# Patient Record
Sex: MALE | Race: OTHER | HISPANIC OR LATINO
[De-identification: names, ages, dates, MRNs, and addresses within clinical notes are randomized per-mention and may not be internally consistent; named-entity substitution may affect disease eponyms.]

---

## 2017-04-09 ENCOUNTER — TRANSCRIPTION ENCOUNTER (OUTPATIENT)
Age: 27
End: 2017-04-09

## 2017-08-10 ENCOUNTER — TRANSCRIPTION ENCOUNTER (OUTPATIENT)
Age: 27
End: 2017-08-10

## 2017-10-25 ENCOUNTER — EMERGENCY (EMERGENCY)
Facility: HOSPITAL | Age: 27
LOS: 0 days | Discharge: HOME | End: 2017-10-26
Attending: EMERGENCY MEDICINE | Admitting: EMERGENCY MEDICINE
Payer: MEDICAID

## 2017-10-25 VITALS
DIASTOLIC BLOOD PRESSURE: 66 MMHG | TEMPERATURE: 98 F | SYSTOLIC BLOOD PRESSURE: 143 MMHG | OXYGEN SATURATION: 100 % | HEART RATE: 105 BPM | RESPIRATION RATE: 16 BRPM | WEIGHT: 214.95 LBS | HEIGHT: 68 IN

## 2017-10-25 PROCEDURE — 99284 EMERGENCY DEPT VISIT MOD MDM: CPT | Mod: 25

## 2017-10-25 RX ORDER — LEVETIRACETAM 250 MG/1
1500 TABLET, FILM COATED ORAL ONCE
Qty: 0 | Refills: 0 | Status: COMPLETED | OUTPATIENT
Start: 2017-10-25 | End: 2017-10-25

## 2017-10-25 RX ORDER — SODIUM CHLORIDE 9 MG/ML
1000 INJECTION INTRAMUSCULAR; INTRAVENOUS; SUBCUTANEOUS ONCE
Qty: 0 | Refills: 0 | Status: COMPLETED | OUTPATIENT
Start: 2017-10-25 | End: 2017-10-25

## 2017-10-25 NOTE — ED PROVIDER NOTE - OBJECTIVE STATEMENT
26 y/o M PMHx Seizure since 17, on Keppra, presents to the ED s/p seizure. The pt provides that he missed his dose of Keppra last night, did not take a dose today morning, and had a seizure today evening. The pt notes that he sat on a chair when the episode occurred, has minor tongue bite and currently has a headache. No h/o head trauma, fever, chills, dizziness, abd pain, nvd, cp, cough, sob, rash or urinary incontinence.

## 2017-10-26 VITALS
RESPIRATION RATE: 17 BRPM | HEART RATE: 90 BPM | DIASTOLIC BLOOD PRESSURE: 63 MMHG | OXYGEN SATURATION: 100 % | TEMPERATURE: 98 F | SYSTOLIC BLOOD PRESSURE: 133 MMHG

## 2017-10-26 DIAGNOSIS — R56.9 UNSPECIFIED CONVULSIONS: ICD-10-CM

## 2017-10-26 RX ADMIN — LEVETIRACETAM 400 MILLIGRAM(S): 250 TABLET, FILM COATED ORAL at 00:57

## 2017-10-26 RX ADMIN — SODIUM CHLORIDE 1000 MILLILITER(S): 9 INJECTION INTRAMUSCULAR; INTRAVENOUS; SUBCUTANEOUS at 00:56

## 2017-10-26 NOTE — ED ADULT NURSE NOTE - CHIEF COMPLAINT QUOTE
pt had witnessed seizure, did not hit head, did not fall, bit tongue. no obvious injury or trauma. no acute distress.

## 2017-10-26 NOTE — ED ADULT NURSE NOTE - NS ED NURSE DC INFO COMPLEXITY
Verbalized Understanding/Patient asked questions/Returned Demonstration/Simple: Patient demonstrates quick and easy understanding/Straightforward: Basic instructions, no meds, no home treatment

## 2017-12-05 ENCOUNTER — EMERGENCY (EMERGENCY)
Facility: HOSPITAL | Age: 27
LOS: 1 days | Discharge: HOME | End: 2017-12-05
Attending: STUDENT IN AN ORGANIZED HEALTH CARE EDUCATION/TRAINING PROGRAM | Admitting: STUDENT IN AN ORGANIZED HEALTH CARE EDUCATION/TRAINING PROGRAM
Payer: COMMERCIAL

## 2017-12-05 VITALS
RESPIRATION RATE: 16 BRPM | TEMPERATURE: 98 F | OXYGEN SATURATION: 98 % | SYSTOLIC BLOOD PRESSURE: 136 MMHG | DIASTOLIC BLOOD PRESSURE: 78 MMHG | HEART RATE: 81 BPM

## 2017-12-05 VITALS
OXYGEN SATURATION: 98 % | RESPIRATION RATE: 18 BRPM | DIASTOLIC BLOOD PRESSURE: 78 MMHG | WEIGHT: 214.95 LBS | HEIGHT: 68 IN | TEMPERATURE: 98 F | SYSTOLIC BLOOD PRESSURE: 144 MMHG | HEART RATE: 94 BPM

## 2017-12-05 LAB
ALBUMIN SERPL ELPH-MCNC: 4.6 G/DL — SIGNIFICANT CHANGE UP (ref 3.3–5.2)
ALP SERPL-CCNC: 88 U/L — SIGNIFICANT CHANGE UP (ref 40–120)
ALT FLD-CCNC: 28 U/L — SIGNIFICANT CHANGE UP
ANION GAP SERPL CALC-SCNC: 17 MMOL/L — SIGNIFICANT CHANGE UP (ref 5–17)
AST SERPL-CCNC: 25 U/L — SIGNIFICANT CHANGE UP
BASOPHILS # BLD AUTO: 0 K/UL — SIGNIFICANT CHANGE UP (ref 0–0.2)
BASOPHILS NFR BLD AUTO: 0.5 % — SIGNIFICANT CHANGE UP (ref 0–2)
BILIRUB SERPL-MCNC: 0.4 MG/DL — SIGNIFICANT CHANGE UP (ref 0.4–2)
BUN SERPL-MCNC: 12 MG/DL — SIGNIFICANT CHANGE UP (ref 8–20)
CALCIUM SERPL-MCNC: 9.5 MG/DL — SIGNIFICANT CHANGE UP (ref 8.6–10.2)
CHLORIDE SERPL-SCNC: 104 MMOL/L — SIGNIFICANT CHANGE UP (ref 98–107)
CO2 SERPL-SCNC: 20 MMOL/L — LOW (ref 22–29)
CREAT SERPL-MCNC: 1.05 MG/DL — SIGNIFICANT CHANGE UP (ref 0.5–1.3)
EOSINOPHIL # BLD AUTO: 0.3 K/UL — SIGNIFICANT CHANGE UP (ref 0–0.5)
EOSINOPHIL NFR BLD AUTO: 3.4 % — SIGNIFICANT CHANGE UP (ref 0–6)
GLUCOSE SERPL-MCNC: 142 MG/DL — HIGH (ref 70–115)
HCT VFR BLD CALC: 43.7 % — SIGNIFICANT CHANGE UP (ref 42–52)
HGB BLD-MCNC: 15.2 G/DL — SIGNIFICANT CHANGE UP (ref 14–18)
LYMPHOCYTES # BLD AUTO: 3.6 K/UL — SIGNIFICANT CHANGE UP (ref 1–4.8)
LYMPHOCYTES # BLD AUTO: 43.3 % — SIGNIFICANT CHANGE UP (ref 20–55)
MCHC RBC-ENTMCNC: 29.7 PG — SIGNIFICANT CHANGE UP (ref 27–31)
MCHC RBC-ENTMCNC: 34.8 G/DL — SIGNIFICANT CHANGE UP (ref 32–36)
MCV RBC AUTO: 85.5 FL — SIGNIFICANT CHANGE UP (ref 80–94)
MONOCYTES # BLD AUTO: 0.8 K/UL — SIGNIFICANT CHANGE UP (ref 0–0.8)
MONOCYTES NFR BLD AUTO: 9.7 % — SIGNIFICANT CHANGE UP (ref 3–10)
NEUTROPHILS # BLD AUTO: 3.5 K/UL — SIGNIFICANT CHANGE UP (ref 1.8–8)
NEUTROPHILS NFR BLD AUTO: 43 % — SIGNIFICANT CHANGE UP (ref 37–73)
PLATELET # BLD AUTO: 237 K/UL — SIGNIFICANT CHANGE UP (ref 150–400)
POTASSIUM SERPL-MCNC: 4 MMOL/L — SIGNIFICANT CHANGE UP (ref 3.5–5.3)
POTASSIUM SERPL-SCNC: 4 MMOL/L — SIGNIFICANT CHANGE UP (ref 3.5–5.3)
PROT SERPL-MCNC: 8 G/DL — SIGNIFICANT CHANGE UP (ref 6.6–8.7)
RBC # BLD: 5.11 M/UL — SIGNIFICANT CHANGE UP (ref 4.6–6.2)
RBC # FLD: 13.3 % — SIGNIFICANT CHANGE UP (ref 11–15.6)
SODIUM SERPL-SCNC: 141 MMOL/L — SIGNIFICANT CHANGE UP (ref 135–145)
WBC # BLD: 8.2 K/UL — SIGNIFICANT CHANGE UP (ref 4.8–10.8)
WBC # FLD AUTO: 8.2 K/UL — SIGNIFICANT CHANGE UP (ref 4.8–10.8)

## 2017-12-05 PROCEDURE — 80053 COMPREHEN METABOLIC PANEL: CPT

## 2017-12-05 PROCEDURE — 99284 EMERGENCY DEPT VISIT MOD MDM: CPT

## 2017-12-05 PROCEDURE — 36415 COLL VENOUS BLD VENIPUNCTURE: CPT

## 2017-12-05 PROCEDURE — 85027 COMPLETE CBC AUTOMATED: CPT

## 2017-12-05 PROCEDURE — 96374 THER/PROPH/DIAG INJ IV PUSH: CPT

## 2017-12-05 PROCEDURE — 99284 EMERGENCY DEPT VISIT MOD MDM: CPT | Mod: 25

## 2017-12-05 RX ORDER — LEVETIRACETAM 250 MG/1
1000 TABLET, FILM COATED ORAL ONCE
Qty: 0 | Refills: 0 | Status: COMPLETED | OUTPATIENT
Start: 2017-12-05 | End: 2017-12-05

## 2017-12-05 RX ADMIN — LEVETIRACETAM 400 MILLIGRAM(S): 250 TABLET, FILM COATED ORAL at 21:54

## 2017-12-05 NOTE — ED ADULT TRIAGE NOTE - CHIEF COMPLAINT QUOTE
pt AOX3, BIBA s/p seizure. states he missed his dose of Keppra last night and today, last seizure was 2015. pt in no distress

## 2017-12-05 NOTE — ED PROVIDER NOTE - CONSTITUTIONAL, MLM
normal... Appears fatigued but states has not been sleeping well at home due to stress, his wife is 32 weeks pregnant.

## 2017-12-05 NOTE — ED PROVIDER NOTE - MEDICAL DECISION MAKING DETAILS
Patient with breakthrough seizure due to missing AM medication Patient with breakthrough seizure due to missing AM medication. Patient remained stable during ED evaluation

## 2017-12-05 NOTE — ED ADULT NURSE REASSESSMENT NOTE - NS ED NURSE REASSESS COMMENT FT1
pt medicated.  discharge instructions reviewed.  pt verbalized understanding.  will f/u with pmd and neurologist.  pt left ambulatory with family driving him home in private car. pt medicated.  iv removed with sl intact.  discharge instructions reviewed.  pt verbalized understanding.  will f/u with pmd and neurologist.  pt left ambulatory with family driving him home in private car.

## 2017-12-05 NOTE — ED PROVIDER NOTE - NEUROLOGICAL, MLM
Alert and oriented, no focal deficits, no motor or sensory deficits. Cranial nerves 2-12 intact. Follows commands. No pronator drift.

## 2018-01-14 ENCOUNTER — TRANSCRIPTION ENCOUNTER (OUTPATIENT)
Age: 28
End: 2018-01-14

## 2018-07-14 ENCOUNTER — EMERGENCY (EMERGENCY)
Facility: HOSPITAL | Age: 28
LOS: 0 days | Discharge: HOME | End: 2018-07-14
Attending: EMERGENCY MEDICINE | Admitting: EMERGENCY MEDICINE
Payer: COMMERCIAL

## 2018-07-14 VITALS
HEART RATE: 103 BPM | RESPIRATION RATE: 18 BRPM | TEMPERATURE: 98 F | SYSTOLIC BLOOD PRESSURE: 129 MMHG | DIASTOLIC BLOOD PRESSURE: 82 MMHG | HEIGHT: 68 IN | OXYGEN SATURATION: 94 % | WEIGHT: 210.1 LBS

## 2018-07-14 DIAGNOSIS — R56.9 UNSPECIFIED CONVULSIONS: ICD-10-CM

## 2018-07-14 PROCEDURE — 99285 EMERGENCY DEPT VISIT HI MDM: CPT

## 2018-07-14 RX ORDER — LEVETIRACETAM 250 MG/1
1500 TABLET, FILM COATED ORAL ONCE
Qty: 0 | Refills: 0 | Status: COMPLETED | OUTPATIENT
Start: 2018-07-14 | End: 2018-07-14

## 2018-07-14 RX ADMIN — LEVETIRACETAM 1500 MILLIGRAM(S): 250 TABLET, FILM COATED ORAL at 13:09

## 2018-07-14 NOTE — ED PROVIDER NOTE - OBJECTIVE STATEMENT
29 y/o m with PMHx of seizures on Keppra presenting to the ED c/o witnessed seizure at home today. Pt states he forgot to take his seizure medication last night. Denies any other acute c/o. No head trauma.

## 2018-07-14 NOTE — ED ADULT NURSE NOTE - OBJECTIVE STATEMENT
pt had a  seizure, however know axox4. Pt states this occurs 2x year and stress/ tiredness is a trigger.

## 2019-07-25 ENCOUNTER — EMERGENCY (EMERGENCY)
Facility: HOSPITAL | Age: 29
LOS: 0 days | Discharge: HOME | End: 2019-07-25
Attending: EMERGENCY MEDICINE
Payer: COMMERCIAL

## 2019-07-25 VITALS
RESPIRATION RATE: 18 BRPM | HEART RATE: 56 BPM | DIASTOLIC BLOOD PRESSURE: 89 MMHG | OXYGEN SATURATION: 100 % | SYSTOLIC BLOOD PRESSURE: 127 MMHG | TEMPERATURE: 98 F

## 2019-07-25 VITALS
HEIGHT: 68 IN | DIASTOLIC BLOOD PRESSURE: 91 MMHG | OXYGEN SATURATION: 97 % | RESPIRATION RATE: 18 BRPM | SYSTOLIC BLOOD PRESSURE: 134 MMHG | TEMPERATURE: 98 F | HEART RATE: 64 BPM | WEIGHT: 214.95 LBS

## 2019-07-25 DIAGNOSIS — K08.89 OTHER SPECIFIED DISORDERS OF TEETH AND SUPPORTING STRUCTURES: ICD-10-CM

## 2019-07-25 DIAGNOSIS — G40.909 EPILEPSY, UNSPECIFIED, NOT INTRACTABLE, WITHOUT STATUS EPILEPTICUS: ICD-10-CM

## 2019-07-25 PROBLEM — R56.9 UNSPECIFIED CONVULSIONS: Chronic | Status: ACTIVE | Noted: 2017-12-06

## 2019-07-25 PROCEDURE — 99283 EMERGENCY DEPT VISIT LOW MDM: CPT

## 2019-07-25 RX ORDER — OXYCODONE HYDROCHLORIDE 5 MG/1
10 TABLET ORAL ONCE
Refills: 0 | Status: DISCONTINUED | OUTPATIENT
Start: 2019-07-25 | End: 2019-07-25

## 2019-07-25 RX ORDER — PENICILLIN V POTASSIUM 250 MG
500 TABLET ORAL ONCE
Refills: 0 | Status: COMPLETED | OUTPATIENT
Start: 2019-07-25 | End: 2019-07-25

## 2019-07-25 RX ORDER — PENICILLIN V POTASSIUM 250 MG
1 TABLET ORAL
Qty: 28 | Refills: 0
Start: 2019-07-25

## 2019-07-25 RX ADMIN — Medication 500 MILLIGRAM(S): at 07:19

## 2019-07-25 RX ADMIN — OXYCODONE HYDROCHLORIDE 10 MILLIGRAM(S): 5 TABLET ORAL at 07:20

## 2019-07-25 NOTE — ED PROVIDER NOTE - NSFOLLOWUPINSTRUCTIONS_ED_ALL_ED_FT
Follow up with your dentist TODAY  Penicillin 500mg every 6 hours for 7 days or until told to stop by your dentist  Ibuprofen 600mg every 6 hours for pain  Oxycodone if needed  Return to ED for any further concerns    Dental Pain    Dental pain (toothache) may be caused by many things including tooth decay (cavities or caries), abscess or infection, or trauma. If you were prescribed an antibiotic medicine, finish all of it even if you start to feel better. Rinsing your mouth with salt water or applying ice to the painful area of your face may help with the pain. Follow up with a dentist is important in ensuring good oral health and preventing the worsening of dental disease.    SEEK IMMEDIATE MEDICAL CARE IF YOU HAVE ANY OF THE FOLLOWING SYMPTOMS: unable to open your mouth, trouble breathing or swallowing, fever, or swelling of the face, neck, or jaw.

## 2019-07-25 NOTE — ED ADULT NURSE NOTE - NSIMPLEMENTINTERV_GEN_ALL_ED
Implemented All Universal Safety Interventions:  [place] to call system. Call bell, personal items and telephone within reach. Instruct patient to call for assistance. Room bathroom lighting operational. Non-slip footwear when patient is off stretcher. Physically safe environment: no spills, clutter or unnecessary equipment. Stretcher in lowest position, wheels locked, appropriate side rails in place.

## 2019-07-25 NOTE — ED ADULT NURSE NOTE - OBJECTIVE STATEMENT
c/o left upper tooth pain especially when eating hot & cold foods. Pt believes his filling fell out.

## 2019-07-25 NOTE — ED PROVIDER NOTE - OBJECTIVE STATEMENT
28 yo male with h/o Seizure d/o on Keppra c/o toothache. Patient has had pain in the left upper tooth for days.  Tonight the pain became severe.  No relief with ibuprofen taken 2-3 hours ago.

## 2019-07-25 NOTE — ED PROVIDER NOTE - CLINICAL SUMMARY MEDICAL DECISION MAKING FREE TEXT BOX
toothache, no s/o abscess, will give abx and a few pain pills.  pt has a dentist he will f/u with today

## 2019-07-25 NOTE — ED PROVIDER NOTE - ENMT, MLM
Airway patent, Nasal mucosa clear. Mouth with normal mucosa. Throat has no vesicles, no oropharyngeal exudates and uvula is midline.  Left upper first molar with partially missing filling, tender with no gum swelling.  Floor of mouth wnl.

## 2020-01-24 ENCOUNTER — EMERGENCY (EMERGENCY)
Facility: HOSPITAL | Age: 30
LOS: 0 days | Discharge: HOME | End: 2020-01-24
Attending: EMERGENCY MEDICINE
Payer: COMMERCIAL

## 2020-01-24 VITALS
WEIGHT: 179.9 LBS | HEART RATE: 108 BPM | TEMPERATURE: 99 F | SYSTOLIC BLOOD PRESSURE: 134 MMHG | DIASTOLIC BLOOD PRESSURE: 90 MMHG | HEIGHT: 67 IN | OXYGEN SATURATION: 97 % | RESPIRATION RATE: 17 BRPM

## 2020-01-24 VITALS
HEART RATE: 98 BPM | OXYGEN SATURATION: 98 % | TEMPERATURE: 98 F | SYSTOLIC BLOOD PRESSURE: 116 MMHG | RESPIRATION RATE: 16 BRPM | DIASTOLIC BLOOD PRESSURE: 77 MMHG
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
## 2020-01-24 DIAGNOSIS — G40.909 EPILEPSY, UNSPECIFIED, NOT INTRACTABLE, WITHOUT STATUS EPILEPTICUS: ICD-10-CM
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
## 2020-01-24 DIAGNOSIS — Z23 ENCOUNTER FOR IMMUNIZATION: ICD-10-CM

## 2020-01-24 DIAGNOSIS — S00.512A ABRASION OF ORAL CAVITY, INITIAL ENCOUNTER: ICD-10-CM

## 2020-01-24 LAB
ALBUMIN SERPL ELPH-MCNC: 4 G/DL — SIGNIFICANT CHANGE UP (ref 3.3–5)
ALP SERPL-CCNC: 74 U/L — SIGNIFICANT CHANGE UP (ref 40–120)
ALT FLD-CCNC: 37 U/L — SIGNIFICANT CHANGE UP (ref 12–78)
ANION GAP SERPL CALC-SCNC: 4 MMOL/L — LOW (ref 5–17)
AST SERPL-CCNC: 20 U/L — SIGNIFICANT CHANGE UP (ref 15–37)
BASOPHILS # BLD AUTO: 0.02 K/UL — SIGNIFICANT CHANGE UP (ref 0–0.2)
BASOPHILS NFR BLD AUTO: 0.2 % — SIGNIFICANT CHANGE UP (ref 0–2)
BILIRUB SERPL-MCNC: 0.5 MG/DL — SIGNIFICANT CHANGE UP (ref 0.2–1.2)
BUN SERPL-MCNC: 13 MG/DL — SIGNIFICANT CHANGE UP (ref 7–23)
CALCIUM SERPL-MCNC: 9 MG/DL — SIGNIFICANT CHANGE UP (ref 8.5–10.1)
CHLORIDE SERPL-SCNC: 109 MMOL/L — HIGH (ref 96–108)
CO2 SERPL-SCNC: 27 MMOL/L — SIGNIFICANT CHANGE UP (ref 22–31)
CREAT SERPL-MCNC: 1.08 MG/DL — SIGNIFICANT CHANGE UP (ref 0.5–1.3)
EOSINOPHIL # BLD AUTO: 0.1 K/UL — SIGNIFICANT CHANGE UP (ref 0–0.5)
EOSINOPHIL NFR BLD AUTO: 1.2 % — SIGNIFICANT CHANGE UP (ref 0–6)
GLUCOSE SERPL-MCNC: 123 MG/DL — HIGH (ref 70–99)
HCT VFR BLD CALC: 45 % — SIGNIFICANT CHANGE UP (ref 39–50)
HGB BLD-MCNC: 15.3 G/DL — SIGNIFICANT CHANGE UP (ref 13–17)
IMM GRANULOCYTES NFR BLD AUTO: 0.4 % — SIGNIFICANT CHANGE UP (ref 0–1.5)
LYMPHOCYTES # BLD AUTO: 2.18 K/UL — SIGNIFICANT CHANGE UP (ref 1–3.3)
LYMPHOCYTES # BLD AUTO: 26.8 % — SIGNIFICANT CHANGE UP (ref 13–44)
MCHC RBC-ENTMCNC: 29.3 PG — SIGNIFICANT CHANGE UP (ref 27–34)
MCHC RBC-ENTMCNC: 34 GM/DL — SIGNIFICANT CHANGE UP (ref 32–36)
MCV RBC AUTO: 86 FL — SIGNIFICANT CHANGE UP (ref 80–100)
MONOCYTES # BLD AUTO: 0.71 K/UL — SIGNIFICANT CHANGE UP (ref 0–0.9)
MONOCYTES NFR BLD AUTO: 8.7 % — SIGNIFICANT CHANGE UP (ref 2–14)
NEUTROPHILS # BLD AUTO: 5.09 K/UL — SIGNIFICANT CHANGE UP (ref 1.8–7.4)
NEUTROPHILS NFR BLD AUTO: 62.7 % — SIGNIFICANT CHANGE UP (ref 43–77)
PLATELET # BLD AUTO: 252 K/UL — SIGNIFICANT CHANGE UP (ref 150–400)
POTASSIUM SERPL-MCNC: 3.9 MMOL/L — SIGNIFICANT CHANGE UP (ref 3.5–5.3)
POTASSIUM SERPL-SCNC: 3.9 MMOL/L — SIGNIFICANT CHANGE UP (ref 3.5–5.3)
PROT SERPL-MCNC: 7.7 GM/DL — SIGNIFICANT CHANGE UP (ref 6–8.3)
RBC # BLD: 5.23 M/UL — SIGNIFICANT CHANGE UP (ref 4.2–5.8)
RBC # FLD: 12.7 % — SIGNIFICANT CHANGE UP (ref 10.3–14.5)
SODIUM SERPL-SCNC: 140 MMOL/L — SIGNIFICANT CHANGE UP (ref 135–145)
WBC # BLD: 8.13 K/UL — SIGNIFICANT CHANGE UP (ref 3.8–10.5)
WBC # FLD AUTO: 8.13 K/UL — SIGNIFICANT CHANGE UP (ref 3.8–10.5)

## 2020-01-24 PROCEDURE — 96360 HYDRATION IV INFUSION INIT: CPT

## 2020-01-24 PROCEDURE — 82962 GLUCOSE BLOOD TEST: CPT

## 2020-01-24 PROCEDURE — 36415 COLL VENOUS BLD VENIPUNCTURE: CPT

## 2020-01-24 PROCEDURE — 93010 ELECTROCARDIOGRAM REPORT: CPT

## 2020-01-24 PROCEDURE — 90471 IMMUNIZATION ADMIN: CPT

## 2020-01-24 PROCEDURE — 80053 COMPREHEN METABOLIC PANEL: CPT

## 2020-01-24 PROCEDURE — 99283 EMERGENCY DEPT VISIT LOW MDM: CPT | Mod: 25

## 2020-01-24 PROCEDURE — 99283 EMERGENCY DEPT VISIT LOW MDM: CPT

## 2020-01-24 PROCEDURE — 90715 TDAP VACCINE 7 YRS/> IM: CPT

## 2020-01-24 PROCEDURE — 85025 COMPLETE CBC W/AUTO DIFF WBC: CPT

## 2020-01-24 PROCEDURE — 93005 ELECTROCARDIOGRAM TRACING: CPT

## 2020-01-24 RX ORDER — SODIUM CHLORIDE 9 MG/ML
1000 INJECTION INTRAMUSCULAR; INTRAVENOUS; SUBCUTANEOUS ONCE
Refills: 0 | Status: COMPLETED | OUTPATIENT
Start: 2020-01-24 | End: 2020-01-24

## 2020-01-24 RX ORDER — TETANUS TOXOID, REDUCED DIPHTHERIA TOXOID AND ACELLULAR PERTUSSIS VACCINE, ADSORBED 5; 2.5; 8; 8; 2.5 [IU]/.5ML; [IU]/.5ML; UG/.5ML; UG/.5ML; UG/.5ML
0.5 SUSPENSION INTRAMUSCULAR ONCE
Refills: 0 | Status: COMPLETED | OUTPATIENT
Start: 2020-01-24 | End: 2020-01-24

## 2020-01-24 RX ADMIN — SODIUM CHLORIDE 1000 MILLILITER(S): 9 INJECTION INTRAMUSCULAR; INTRAVENOUS; SUBCUTANEOUS at 07:50

## 2020-01-24 RX ADMIN — SODIUM CHLORIDE 1000 MILLILITER(S): 9 INJECTION INTRAMUSCULAR; INTRAVENOUS; SUBCUTANEOUS at 08:50

## 2020-01-24 RX ADMIN — TETANUS TOXOID, REDUCED DIPHTHERIA TOXOID AND ACELLULAR PERTUSSIS VACCINE, ADSORBED 0.5 MILLILITER(S): 5; 2.5; 8; 8; 2.5 SUSPENSION INTRAMUSCULAR at 08:01

## 2020-01-24 NOTE — ED PROVIDER NOTE - PATIENT PORTAL LINK FT
You can access the FollowMyHealth Patient Portal offered by [facility] by registering at the following website: [email or web address]. By joining [employer]’s FollowMyHealth portal, you will also be able to view your health information using other applications (apps) compatible with our system.

## 2020-01-24 NOTE — ED PROVIDER NOTE - OBJECTIVE STATEMENT
29M hx epilepsy on keppra 1250mg BID presents to the ED for seizure. Pt states he woke up this morning and knows he had a seizure - feels soreness on his tongue dehydrated. family states he was confused so called EMS. no back to baseline. no head injury. 29M hx epilepsy on keppra 1250mg BID presents to the ED for seizure. Pt states he woke up this morning and knows he had a seizure - feels soreness on his tongue dehydrated. family states he was confused so called EMS. now back to baseline. no head injury. states he missed his dose of keppra last night and hasn't been sleeping as well lately. follows with neurology DR. Pozo. no fevers neck pain cp sob abd pain nausea vomiting diarrhea.

## 2020-01-24 NOTE — ED PROVIDER NOTE - PENDING LAB RAD OPT OUT
family thinks pt yellow white of eye dull  pt denies any pain or dizziness  pt appears pale  on vanco for mrsa r hip
Exclude Pending Lab and Radiology orders from printing on the Patient's Discharge Instructions, due to Privacy Concerns.

## 2020-01-24 NOTE — ED PROVIDER NOTE - NS ED ROS FT
Constitutional: No fever or chills  Eyes: No visual changes  HEENT: No throat pain  CV: No chest pain  Resp: No SOB no cough  GI: No abd pain, nausea or vomiting  : No dysuria  MSK: No musculoskeletal pain  Skin: abrasion to right hand  Neuro: No headache  + seizure

## 2020-01-24 NOTE — ED ADULT TRIAGE NOTE - CHIEF COMPLAINT QUOTE
PT BIBA witnessed seizure x 1 minute, fall was assisted by roommate, -head strike, +tongue biting, +aura, minor abrasions suffered on R knuckle.  pt hx of epilepsy on Keppra x 5-6 years, pt took medication prior to seizure.  pt a/ox3 upon arrival to ed, following commands, vss.

## 2020-01-24 NOTE — ED PROVIDER NOTE - PROGRESS NOTE DETAILS
labs/ekg unremarkable. pt at baseline - wants to go home. no signs of head trauma. seizure likely from missed keppra dosing. will d/c with neurology follow up and strict return precautions. Abhishek Ferrera M.D., Attending Physician labs/ekg unremarkable. pt at baseline - wants to go home. no signs of head trauma. seizure likely from missed keppra dosing. vss ambulating without issue. will d/c with neurology follow up and strict return precautions. Abhishek Ferrera M.D., Attending Physician

## 2020-01-24 NOTE — ED PROVIDER NOTE - NSFOLLOWUPINSTRUCTIONS_ED_ALL_ED_FT
1. return for worsening symptoms or anything concerning to you  2. take all home meds as prescribed  3. follow up with your pmd call to make an appointment    Seizure, Adult  When you have a seizure:    Parts of your body may move.  How aware or awake (conscious) you are may change.  You may shake (convulse).    Some people have symptoms right before a seizure happens. These symptoms may include:    Fear.  Worry (anxiety).  Feeling like you are going to throw up (nausea).  Feeling like the room is spinning (vertigo).  Feeling like you saw or heard something before (arianna vu).  Odd tastes or smells.  Changes in vision, such as seeing flashing lights or spots.    ImageSeizures usually last from 30 seconds to 2 minutes. Usually, they are not harmful unless they last a long time.    Follow these instructions at home:  Medicines     Take over-the-counter and prescription medicines only as told by your doctor.  Avoid anything that may keep your medicine from working, such as alcohol.  Activity     Do not do any activities that would be dangerous if you had another seizure, like driving or swimming. Wait until your doctor approves.  If you live in the [place] ask your local DMV (department of [place]) when you can drive.  Rest.  Teaching others     Teach friends and family what to do when you have a seizure. They should:    Lay you on the ground.  Protect your head and body.  Loosen any tight clothing around your neck.  Turn you on your side.  Stay with you until you are better.  Not hold you down.  Not put anything in your mouth.  Know whether or not you need emergency care.    General instructions     Contact your doctor each time you have a seizure.  Avoid anything that gives you seizures.  Keep a seizure diary. Write down:    What you think caused each seizure.  What you remember about each seizure.    Keep all follow-up visits as told by your doctor. This is important.  Contact a doctor if:  You have another seizure.  You have seizures more often.  There is any change in what happens during your seizures.  You continue to have seizures with treatment.  You have symptoms of being sick or having an infection.  Get help right away if:  You have a seizure:    That lasts longer than 5 minutes.  That is different than seizures you had before.  That makes it harder to breathe.  After you hurt your head.    After a seizure, you cannot speak or use a part of your body.  After a seizure, you are confused or have a bad headache.  You have two or more seizures in a row.  You are having seizures more often.  You do not wake up right after a seizure.  You get hurt during a seizure.  In an emergency:     These symptoms may be an emergency. Do not wait to see if the symptoms will go away. Get medical help right away. Call your local emergency services (911 in the [place] Do not drive yourself to the hospital.   This information is not intended to replace advice given to you by your health care provider. Make sure you discuss any questions you have with your health care provider.

## 2020-01-24 NOTE — ED ADULT NURSE NOTE - NSIMPLEMENTINTERV_GEN_ALL_ED
Implemented All Universal Safety Interventions:  [place] to call system. Call bell, personal items and telephone within reach. Instruct patient to call for assistance. Room bathroom lighting operational. Non-slip footwear when patient is off stretcher. Physically safe environment: no spills, clutter or unnecessary equipment. Stretcher in lowest position, wheels locked, appropriate side rails in place.

## 2020-01-24 NOTE — ED PROVIDER NOTE - CLINICAL SUMMARY MEDICAL DECISION MAKING FREE TEXT BOX
29M hx epilepsy on keppra 1250mg BID presents to the ED for seizure. Pt states he woke up this morning and knows he had a seizure - feels soreness on his tongue dehydrated. family states he was confused so called EMS. now back to baseline. no head injury. states he missed his dose of keppra last night and hasn't been sleeping as well lately. follows with neurology DR. Pozo. no fevers neck pain cp sob abd pain nausea vomiting diarrhea. seizure likely 2/2 to missed dose of keppra and not sleeping. will check sodium ekg fs and if normal workup with d/c with neuro follow up. Abhishek Ferrera M.D., Attending Physician

## 2020-01-24 NOTE — ED PROVIDER NOTE - PHYSICAL EXAMINATION
Constitutional: NAD AAOx3  Eyes: PERRLA EOMI  Head: Normocephalic atraumatic mild abrasion to right tongue  Mouth: MMM  Cardiac: regular rate   Resp: Lungs CTAB  GI: Abd s/nt/nd  Neuro: CN2-12 intact normal strength sensation coordination   Skin: abrasion to right 3 4 5th digit  msk: no bony ttp Constitutional: NAD AAOx3  Eyes: PERRLA EOMI  Head: Normocephalic atraumatic mild abrasion to right tongue  Mouth: MMM  Cardiac: tachycardic  Resp: Lungs CTAB  GI: Abd s/nt/nd  Neuro: CN2-12 intact normal strength sensation coordination   Skin: abrasion to right 3 4 5th digit  msk: no bony ttp

## 2020-01-24 NOTE — ED ADULT NURSE NOTE - OBJECTIVE STATEMENT
29M hx epilepsy on keppra 1250mg BID presents to the ED for seizure. Pt states he woke up this morning and knows he had a seizure - feels soreness on his tongue dehydrated. family states he was confused so called EMS. now back to baseline. no head injury. states he missed his dose of keppra last night and hasn't been sleeping as well lately. follows with neurology DR. Pozo. no fevers neck pain cp sob abd pain nausea vomiting diarrhea

## 2020-02-10 ENCOUNTER — EMERGENCY (EMERGENCY)
Facility: HOSPITAL | Age: 30
LOS: 0 days | Discharge: HOME | End: 2020-02-10
Attending: EMERGENCY MEDICINE
Payer: COMMERCIAL

## 2020-02-10 VITALS
HEART RATE: 86 BPM | RESPIRATION RATE: 15 BRPM | SYSTOLIC BLOOD PRESSURE: 131 MMHG | OXYGEN SATURATION: 98 % | DIASTOLIC BLOOD PRESSURE: 81 MMHG

## 2020-02-10 VITALS
SYSTOLIC BLOOD PRESSURE: 135 MMHG | DIASTOLIC BLOOD PRESSURE: 86 MMHG | HEART RATE: 91 BPM | TEMPERATURE: 98 F | RESPIRATION RATE: 17 BRPM | HEIGHT: 68 IN | WEIGHT: 214.95 LBS | OXYGEN SATURATION: 95 %

## 2020-02-10 DIAGNOSIS — R56.9 UNSPECIFIED CONVULSIONS: ICD-10-CM

## 2020-02-10 DIAGNOSIS — R51 HEADACHE: ICD-10-CM

## 2020-02-10 LAB
ALBUMIN SERPL ELPH-MCNC: 4 G/DL — SIGNIFICANT CHANGE UP (ref 3.3–5)
ALP SERPL-CCNC: 79 U/L — SIGNIFICANT CHANGE UP (ref 40–120)
ALT FLD-CCNC: 34 U/L — SIGNIFICANT CHANGE UP (ref 12–78)
ANION GAP SERPL CALC-SCNC: 6 MMOL/L — SIGNIFICANT CHANGE UP (ref 5–17)
APTT BLD: 30.1 SEC — SIGNIFICANT CHANGE UP (ref 27.5–36.3)
AST SERPL-CCNC: 22 U/L — SIGNIFICANT CHANGE UP (ref 15–37)
BASOPHILS # BLD AUTO: 0.03 K/UL — SIGNIFICANT CHANGE UP (ref 0–0.2)
BASOPHILS NFR BLD AUTO: 0.3 % — SIGNIFICANT CHANGE UP (ref 0–2)
BILIRUB SERPL-MCNC: 0.4 MG/DL — SIGNIFICANT CHANGE UP (ref 0.2–1.2)
BUN SERPL-MCNC: 12 MG/DL — SIGNIFICANT CHANGE UP (ref 7–23)
CALCIUM SERPL-MCNC: 9 MG/DL — SIGNIFICANT CHANGE UP (ref 8.5–10.1)
CHLORIDE SERPL-SCNC: 110 MMOL/L — HIGH (ref 96–108)
CO2 SERPL-SCNC: 21 MMOL/L — LOW (ref 22–31)
CREAT SERPL-MCNC: 1.14 MG/DL — SIGNIFICANT CHANGE UP (ref 0.5–1.3)
EOSINOPHIL # BLD AUTO: 0.09 K/UL — SIGNIFICANT CHANGE UP (ref 0–0.5)
EOSINOPHIL NFR BLD AUTO: 1 % — SIGNIFICANT CHANGE UP (ref 0–6)
GLUCOSE SERPL-MCNC: 154 MG/DL — HIGH (ref 70–99)
HCT VFR BLD CALC: 44.7 % — SIGNIFICANT CHANGE UP (ref 39–50)
HGB BLD-MCNC: 15.3 G/DL — SIGNIFICANT CHANGE UP (ref 13–17)
IMM GRANULOCYTES NFR BLD AUTO: 0.2 % — SIGNIFICANT CHANGE UP (ref 0–1.5)
INR BLD: 1.06 RATIO — SIGNIFICANT CHANGE UP (ref 0.88–1.16)
LYMPHOCYTES # BLD AUTO: 2.02 K/UL — SIGNIFICANT CHANGE UP (ref 1–3.3)
LYMPHOCYTES # BLD AUTO: 21.3 % — SIGNIFICANT CHANGE UP (ref 13–44)
MCHC RBC-ENTMCNC: 29.8 PG — SIGNIFICANT CHANGE UP (ref 27–34)
MCHC RBC-ENTMCNC: 34.2 GM/DL — SIGNIFICANT CHANGE UP (ref 32–36)
MCV RBC AUTO: 87 FL — SIGNIFICANT CHANGE UP (ref 80–100)
MONOCYTES # BLD AUTO: 0.55 K/UL — SIGNIFICANT CHANGE UP (ref 0–0.9)
MONOCYTES NFR BLD AUTO: 5.8 % — SIGNIFICANT CHANGE UP (ref 2–14)
NEUTROPHILS # BLD AUTO: 6.76 K/UL — SIGNIFICANT CHANGE UP (ref 1.8–7.4)
NEUTROPHILS NFR BLD AUTO: 71.4 % — SIGNIFICANT CHANGE UP (ref 43–77)
PLATELET # BLD AUTO: 247 K/UL — SIGNIFICANT CHANGE UP (ref 150–400)
POTASSIUM SERPL-MCNC: 4.1 MMOL/L — SIGNIFICANT CHANGE UP (ref 3.5–5.3)
POTASSIUM SERPL-SCNC: 4.1 MMOL/L — SIGNIFICANT CHANGE UP (ref 3.5–5.3)
PROT SERPL-MCNC: 8.1 GM/DL — SIGNIFICANT CHANGE UP (ref 6–8.3)
PROTHROM AB SERPL-ACNC: 11.8 SEC — SIGNIFICANT CHANGE UP (ref 10–12.9)
RBC # BLD: 5.14 M/UL — SIGNIFICANT CHANGE UP (ref 4.2–5.8)
RBC # FLD: 13.1 % — SIGNIFICANT CHANGE UP (ref 10.3–14.5)
SODIUM SERPL-SCNC: 137 MMOL/L — SIGNIFICANT CHANGE UP (ref 135–145)
WBC # BLD: 9.47 K/UL — SIGNIFICANT CHANGE UP (ref 3.8–10.5)
WBC # FLD AUTO: 9.47 K/UL — SIGNIFICANT CHANGE UP (ref 3.8–10.5)

## 2020-02-10 PROCEDURE — 85025 COMPLETE CBC W/AUTO DIFF WBC: CPT

## 2020-02-10 PROCEDURE — 85610 PROTHROMBIN TIME: CPT

## 2020-02-10 PROCEDURE — 36415 COLL VENOUS BLD VENIPUNCTURE: CPT

## 2020-02-10 PROCEDURE — 80053 COMPREHEN METABOLIC PANEL: CPT

## 2020-02-10 PROCEDURE — 99283 EMERGENCY DEPT VISIT LOW MDM: CPT

## 2020-02-10 PROCEDURE — 99284 EMERGENCY DEPT VISIT MOD MDM: CPT

## 2020-02-10 PROCEDURE — 85730 THROMBOPLASTIN TIME PARTIAL: CPT

## 2020-02-10 PROCEDURE — 80177 DRUG SCRN QUAN LEVETIRACETAM: CPT

## 2020-02-10 RX ORDER — ACETAMINOPHEN 500 MG
1000 TABLET ORAL ONCE
Refills: 0 | Status: COMPLETED | OUTPATIENT
Start: 2020-02-10 | End: 2020-02-10

## 2020-02-10 RX ORDER — LEVETIRACETAM 250 MG/1
1500 TABLET, FILM COATED ORAL ONCE
Refills: 0 | Status: COMPLETED | OUTPATIENT
Start: 2020-02-10 | End: 2020-02-10

## 2020-02-10 RX ADMIN — Medication 1000 MILLIGRAM(S): at 09:19

## 2020-02-10 RX ADMIN — LEVETIRACETAM 1500 MILLIGRAM(S): 250 TABLET, FILM COATED ORAL at 09:50

## 2020-02-10 NOTE — ED ADULT TRIAGE NOTE - CHIEF COMPLAINT QUOTE
Patient presents s/p seizure at approximately 6:30am. witnessed by brother. lasted approximately 1 min and was "typical symptoms" per EMS who received information from brother. Patient recently increased keppra dose.

## 2020-02-10 NOTE — ED PROVIDER NOTE - NSFOLLOWUPINSTRUCTIONS_ED_ALL_ED_FT
follow-up with your neurologist (they will call you today)  Return with any recurrent episodes  Continue Keppra

## 2020-02-10 NOTE — ED PROVIDER NOTE - PATIENT PORTAL LINK FT
You can access the FollowMyHealth Patient Portal offered by [facility] by registering at the following website: [email or web address]. By joining [employer]’s FollowMyHealth portal, you will also be able to view your health information using other applications (apps) compatible with our system.

## 2020-02-10 NOTE — ED ADULT NURSE NOTE - OBJECTIVE STATEMENT
Patient came in with grand mal seizure. Seizure was witnessed by patients brother, lasting about 1 min. Patient was post ictal after seizure. Patient now A&O x3. Patient recently had Keppra raised to 750 mg twice in morning, twice at night for break through seizures. Denies chest pain, sob.

## 2020-02-10 NOTE — ED PROVIDER NOTE - OBJECTIVE STATEMENT
29M hx seizures (on Keppra) BIBA following witnessed GTC seizure (typical of prior episodes). Minor tongue abrasions. Last sz 1/2020 -- Keppra increased from 1250 BID to 1500 BID. Poor sleep recently.  No fever/chills, recent illnesses.  Mild headache at present.  Denies missed doses (although didn't take this morning).

## 2020-02-11 LAB — LEVETIRACETAM SERPL-MCNC: <2 MCG/ML — LOW (ref 12–46)

## 2020-02-13 NOTE — ED POST DISCHARGE NOTE - REASON FOR FOLLOW-UP
Other Contacted patient by phone and reported results of Levetiracetam level and instructed to F/U with PMD and need to take medication as directed. . Iliana NP

## 2020-03-19 ENCOUNTER — EMERGENCY (EMERGENCY)
Facility: HOSPITAL | Age: 30
LOS: 0 days | Discharge: HOME | End: 2020-03-19
Attending: STUDENT IN AN ORGANIZED HEALTH CARE EDUCATION/TRAINING PROGRAM
Payer: COMMERCIAL

## 2020-03-19 VITALS
HEIGHT: 68 IN | HEART RATE: 123 BPM | WEIGHT: 214.95 LBS | TEMPERATURE: 100 F | SYSTOLIC BLOOD PRESSURE: 136 MMHG | DIASTOLIC BLOOD PRESSURE: 66 MMHG | OXYGEN SATURATION: 97 % | RESPIRATION RATE: 17 BRPM

## 2020-03-19 DIAGNOSIS — R00.0 TACHYCARDIA, UNSPECIFIED: ICD-10-CM

## 2020-03-19 DIAGNOSIS — G40.909 EPILEPSY, UNSPECIFIED, NOT INTRACTABLE, WITHOUT STATUS EPILEPTICUS: ICD-10-CM

## 2020-03-19 LAB
ANION GAP SERPL CALC-SCNC: 5 MMOL/L — SIGNIFICANT CHANGE UP (ref 5–17)
BUN SERPL-MCNC: 10 MG/DL — SIGNIFICANT CHANGE UP (ref 7–23)
CALCIUM SERPL-MCNC: 9.1 MG/DL — SIGNIFICANT CHANGE UP (ref 8.5–10.1)
CHLORIDE SERPL-SCNC: 106 MMOL/L — SIGNIFICANT CHANGE UP (ref 96–108)
CO2 SERPL-SCNC: 25 MMOL/L — SIGNIFICANT CHANGE UP (ref 22–31)
CREAT SERPL-MCNC: 1.13 MG/DL — SIGNIFICANT CHANGE UP (ref 0.5–1.3)
GLUCOSE SERPL-MCNC: 125 MG/DL — HIGH (ref 70–99)
POTASSIUM SERPL-MCNC: 4.1 MMOL/L — SIGNIFICANT CHANGE UP (ref 3.5–5.3)
POTASSIUM SERPL-SCNC: 4.1 MMOL/L — SIGNIFICANT CHANGE UP (ref 3.5–5.3)
SODIUM SERPL-SCNC: 136 MMOL/L — SIGNIFICANT CHANGE UP (ref 135–145)

## 2020-03-19 PROCEDURE — 99284 EMERGENCY DEPT VISIT MOD MDM: CPT

## 2020-03-19 PROCEDURE — 96374 THER/PROPH/DIAG INJ IV PUSH: CPT

## 2020-03-19 PROCEDURE — 93010 ELECTROCARDIOGRAM REPORT: CPT

## 2020-03-19 PROCEDURE — 36415 COLL VENOUS BLD VENIPUNCTURE: CPT

## 2020-03-19 PROCEDURE — 99284 EMERGENCY DEPT VISIT MOD MDM: CPT | Mod: 25

## 2020-03-19 PROCEDURE — 93005 ELECTROCARDIOGRAM TRACING: CPT

## 2020-03-19 PROCEDURE — 80048 BASIC METABOLIC PNL TOTAL CA: CPT

## 2020-03-19 RX ORDER — LEVETIRACETAM 250 MG/1
1500 TABLET, FILM COATED ORAL ONCE
Refills: 0 | Status: COMPLETED | OUTPATIENT
Start: 2020-03-19 | End: 2020-03-19

## 2020-03-19 RX ADMIN — LEVETIRACETAM 400 MILLIGRAM(S): 250 TABLET, FILM COATED ORAL at 10:36

## 2020-03-19 NOTE — ED PROVIDER NOTE - PATIENT PORTAL LINK FT
You can access the FollowMyHealth Patient Portal offered by [facility] by registering at the following website: [email or web address]. By joining [employer]’s FollowMyHealth portal, you will also be able to view your health information using other applications (apps) compatible with our system.

## 2020-03-19 NOTE — ED PROVIDER NOTE - NSFOLLOWUPINSTRUCTIONS_ED_ALL_ED_FT
Follow up with your neurologist.    Seizure, Adult  When you have a seizure:    Parts of your body may move.  How aware or awake (conscious) you are may change.  You may shake (convulse).    Some people have symptoms right before a seizure happens. These symptoms may include:    Fear.  Worry (anxiety).  Feeling like you are going to throw up (nausea).  Feeling like the room is spinning (vertigo).  Feeling like you saw or heard something before (arianna vu).  Odd tastes or smells.  Changes in vision, such as seeing flashing lights or spots.    ImageSeizures usually last from 30 seconds to 2 minutes. Usually, they are not harmful unless they last a long time.    Follow these instructions at home:  Medicines     Take over-the-counter and prescription medicines only as told by your doctor.  Avoid anything that may keep your medicine from working, such as alcohol.  Activity     Do not do any activities that would be dangerous if you had another seizure, like driving or swimming. Wait until your doctor approves.  If you live in the [place] ask your local DMV (department of [place]) when you can drive.  Rest.  Teaching others     Teach friends and family what to do when you have a seizure. They should:    Lay you on the ground.  Protect your head and body.  Loosen any tight clothing around your neck.  Turn you on your side.  Stay with you until you are better.  Not hold you down.  Not put anything in your mouth.  Know whether or not you need emergency care.    General instructions     Contact your doctor each time you have a seizure.  Avoid anything that gives you seizures.  Keep a seizure diary. Write down:    What you think caused each seizure.  What you remember about each seizure.    Keep all follow-up visits as told by your doctor. This is important.  Contact a doctor if:  You have another seizure.  You have seizures more often.  There is any change in what happens during your seizures.  You continue to have seizures with treatment.  You have symptoms of being sick or having an infection.  Get help right away if:  You have a seizure:    That lasts longer than 5 minutes.  That is different than seizures you had before.  That makes it harder to breathe.  After you hurt your head.    After a seizure, you cannot speak or use a part of your body.  After a seizure, you are confused or have a bad headache.  You have two or more seizures in a row.  You are having seizures more often.  You do not wake up right after a seizure.  You get hurt during a seizure.  In an emergency:     These symptoms may be an emergency. Do not wait to see if the symptoms will go away. Get medical help right away. Call your local emergency services (911 in the [place] Do not drive yourself to the hospital.   This information is not intended to replace advice given to you by your health care provider. Make sure you discuss any questions you have with your health care provider.

## 2020-03-19 NOTE — ED PROVIDER NOTE - OBJECTIVE STATEMENT
29 y/o male with a PMHx of seizure disorder presents to the ED s/p seizure today. Pt is on Keppra 1500mg BID, states he fell asleep before taking his Keppra last night, has not missed any other doses. Also states his sleep schedule has not been very regular. This morning pt states he had a seizure, does not remember much about the incident. Pt states he bit his tongue. Unknown head injury but pt denies any pain. No bladder or bowel incontinence. Feels back to baseline currently. Has not taken Keppra yet this morning. Has had seizure disorder x13 years. Last seizure was within the year. Neuro- Dr. Oates

## 2020-03-19 NOTE — ED ADULT TRIAGE NOTE - CHIEF COMPLAINT QUOTE
pt presents to ed via ems post seizure this morning at home witnessed  by grandma. pt has hx of seizure, pt states he missed "1 or 2 kepra doses". pt denies travel or fevers at home. pt alert and oriented x 4, talking, in no distress.

## 2020-03-19 NOTE — ED PROVIDER NOTE - CLINICAL SUMMARY MEDICAL DECISION MAKING FREE TEXT BOX
30M s/p seizure. Known seizure disorder. Plan for basic labs, load with Keppra, follow up with neurology.

## 2020-03-26 ENCOUNTER — EMERGENCY (EMERGENCY)
Facility: HOSPITAL | Age: 30
LOS: 0 days | Discharge: HOME | End: 2020-03-26
Attending: EMERGENCY MEDICINE
Payer: COMMERCIAL

## 2020-03-26 VITALS
OXYGEN SATURATION: 98 % | HEART RATE: 106 BPM | TEMPERATURE: 99 F | RESPIRATION RATE: 18 BRPM | DIASTOLIC BLOOD PRESSURE: 82 MMHG | SYSTOLIC BLOOD PRESSURE: 132 MMHG

## 2020-03-26 VITALS — OXYGEN SATURATION: 100 % | HEART RATE: 90 BPM | WEIGHT: 214.95 LBS | HEIGHT: 68 IN

## 2020-03-26 DIAGNOSIS — R50.9 FEVER, UNSPECIFIED: ICD-10-CM

## 2020-03-26 DIAGNOSIS — R06.02 SHORTNESS OF BREATH: ICD-10-CM

## 2020-03-26 DIAGNOSIS — R05 COUGH: ICD-10-CM

## 2020-03-26 DIAGNOSIS — B34.9 VIRAL INFECTION, UNSPECIFIED: ICD-10-CM

## 2020-03-26 DIAGNOSIS — J45.909 UNSPECIFIED ASTHMA, UNCOMPLICATED: ICD-10-CM

## 2020-03-26 PROCEDURE — 99282 EMERGENCY DEPT VISIT SF MDM: CPT

## 2020-03-26 PROCEDURE — 99283 EMERGENCY DEPT VISIT LOW MDM: CPT

## 2020-03-26 NOTE — ED STATDOCS - PROGRESS NOTE DETAILS
31 yo male with a PMH of seizures presents with cough, fever, t max of 102F, and SOB x 1 week. Lungs CTA b/l with O2 sat 100% on RA. Pt advised by Dr. Mooney that he does not need to be tested that it is likely COVID and he needs to be quarantined, tylenol only for fever or pain, and supportive care. Pt aware and agrees with plan. -David Andrade PA-C

## 2020-03-26 NOTE — ED STATDOCS - CLINICAL SUMMARY MEDICAL DECISION MAKING FREE TEXT BOX
29 y/o male hx of asthma, c/o cough, fever, SOB that started a few days ago, getting worse. No sick contact or travel. Exam with clear lungs, normal heart rate and temp, pt is well appearing. spoke with pt about home isolation and that fact he most likely has corona, no sign of sepsis, PNA, or PE. will DC with isolation precaution.

## 2020-03-26 NOTE — ED STATDOCS - PATIENT PORTAL LINK FT
You can access the FollowMyHealth Patient Portal offered by [facility] by registering at the following website: [email or web address]. By joining [employer]’s FollowMyHealth portal, you will also be able to view your health information using other applications (apps) compatible with our system.

## 2020-03-26 NOTE — ED STATDOCS - NS_ ATTENDINGSCRIBEDETAILS _ED_A_ED_FT
I, Abhishek Ferrera MD,  performed the initial face to face bedside interview with this patient regarding history of present illness, review of symptoms and relevant past medical, social and family history.  I completed an independent physical examination.  I was the initial provider who evaluated this patient.  The history, relevant review of systems, past medical and surgical history, medical decision making, and physical examination was documented by the scribe in my presence and I attest to the accuracy of the documentation.

## 2020-03-26 NOTE — ED STATDOCS - CARE PLAN
Principal Discharge DX:	Viral infection  Secondary Diagnosis:	Cough  Secondary Diagnosis:	Fever, unspecified fever cause

## 2020-03-26 NOTE — ED STATDOCS - OBJECTIVE STATEMENT
31 y/o male with a PMHx of Asthma never been intubated, Seizures, presents to the ED for cough, fever, SOB, started a few day ago, getting worse. Didn't take anything for sx. No sick contact or recent travel. No leg swelling.

## 2020-07-04 ENCOUNTER — EMERGENCY (EMERGENCY)
Facility: HOSPITAL | Age: 30
LOS: 0 days | Discharge: HOME | End: 2020-07-04
Attending: EMERGENCY MEDICINE
Payer: COMMERCIAL

## 2020-07-04 VITALS
OXYGEN SATURATION: 96 % | HEART RATE: 109 BPM | DIASTOLIC BLOOD PRESSURE: 79 MMHG | RESPIRATION RATE: 20 BRPM | HEIGHT: 68 IN | SYSTOLIC BLOOD PRESSURE: 122 MMHG | WEIGHT: 240.08 LBS

## 2020-07-04 VITALS
SYSTOLIC BLOOD PRESSURE: 125 MMHG | DIASTOLIC BLOOD PRESSURE: 80 MMHG | TEMPERATURE: 98 F | OXYGEN SATURATION: 97 % | HEART RATE: 85 BPM | RESPIRATION RATE: 18 BRPM

## 2020-07-04 DIAGNOSIS — G40.909 EPILEPSY, UNSPECIFIED, NOT INTRACTABLE, WITHOUT STATUS EPILEPTICUS: ICD-10-CM

## 2020-07-04 LAB
ANION GAP SERPL CALC-SCNC: 6 MMOL/L — SIGNIFICANT CHANGE UP (ref 5–17)
BASOPHILS # BLD AUTO: 0.05 K/UL — SIGNIFICANT CHANGE UP (ref 0–0.2)
BASOPHILS NFR BLD AUTO: 0.5 % — SIGNIFICANT CHANGE UP (ref 0–2)
BUN SERPL-MCNC: 14 MG/DL — SIGNIFICANT CHANGE UP (ref 7–23)
CALCIUM SERPL-MCNC: 9.9 MG/DL — SIGNIFICANT CHANGE UP (ref 8.5–10.1)
CHLORIDE SERPL-SCNC: 107 MMOL/L — SIGNIFICANT CHANGE UP (ref 96–108)
CK SERPL-CCNC: 290 U/L — SIGNIFICANT CHANGE UP (ref 26–308)
CO2 SERPL-SCNC: 28 MMOL/L — SIGNIFICANT CHANGE UP (ref 22–31)
CREAT SERPL-MCNC: 1.26 MG/DL — SIGNIFICANT CHANGE UP (ref 0.5–1.3)
EOSINOPHIL # BLD AUTO: 0.24 K/UL — SIGNIFICANT CHANGE UP (ref 0–0.5)
EOSINOPHIL NFR BLD AUTO: 2.4 % — SIGNIFICANT CHANGE UP (ref 0–6)
GLUCOSE SERPL-MCNC: 113 MG/DL — HIGH (ref 70–99)
HCT VFR BLD CALC: 42.5 % — SIGNIFICANT CHANGE UP (ref 39–50)
HGB BLD-MCNC: 14.9 G/DL — SIGNIFICANT CHANGE UP (ref 13–17)
IMM GRANULOCYTES NFR BLD AUTO: 0.4 % — SIGNIFICANT CHANGE UP (ref 0–1.5)
LYMPHOCYTES # BLD AUTO: 3.39 K/UL — HIGH (ref 1–3.3)
LYMPHOCYTES # BLD AUTO: 33.6 % — SIGNIFICANT CHANGE UP (ref 13–44)
MCHC RBC-ENTMCNC: 30.2 PG — SIGNIFICANT CHANGE UP (ref 27–34)
MCHC RBC-ENTMCNC: 35.1 GM/DL — SIGNIFICANT CHANGE UP (ref 32–36)
MCV RBC AUTO: 86.2 FL — SIGNIFICANT CHANGE UP (ref 80–100)
MONOCYTES # BLD AUTO: 0.86 K/UL — SIGNIFICANT CHANGE UP (ref 0–0.9)
MONOCYTES NFR BLD AUTO: 8.5 % — SIGNIFICANT CHANGE UP (ref 2–14)
NEUTROPHILS # BLD AUTO: 5.52 K/UL — SIGNIFICANT CHANGE UP (ref 1.8–7.4)
NEUTROPHILS NFR BLD AUTO: 54.6 % — SIGNIFICANT CHANGE UP (ref 43–77)
PLATELET # BLD AUTO: 261 K/UL — SIGNIFICANT CHANGE UP (ref 150–400)
POTASSIUM SERPL-MCNC: 4.1 MMOL/L — SIGNIFICANT CHANGE UP (ref 3.5–5.3)
POTASSIUM SERPL-SCNC: 4.1 MMOL/L — SIGNIFICANT CHANGE UP (ref 3.5–5.3)
RBC # BLD: 4.93 M/UL — SIGNIFICANT CHANGE UP (ref 4.2–5.8)
RBC # FLD: 13 % — SIGNIFICANT CHANGE UP (ref 10.3–14.5)
SODIUM SERPL-SCNC: 141 MMOL/L — SIGNIFICANT CHANGE UP (ref 135–145)
WBC # BLD: 10.1 K/UL — SIGNIFICANT CHANGE UP (ref 3.8–10.5)
WBC # FLD AUTO: 10.1 K/UL — SIGNIFICANT CHANGE UP (ref 3.8–10.5)

## 2020-07-04 PROCEDURE — 80048 BASIC METABOLIC PNL TOTAL CA: CPT

## 2020-07-04 PROCEDURE — 82550 ASSAY OF CK (CPK): CPT

## 2020-07-04 PROCEDURE — 99283 EMERGENCY DEPT VISIT LOW MDM: CPT | Mod: 25

## 2020-07-04 PROCEDURE — 93005 ELECTROCARDIOGRAM TRACING: CPT

## 2020-07-04 PROCEDURE — 93010 ELECTROCARDIOGRAM REPORT: CPT

## 2020-07-04 PROCEDURE — 85025 COMPLETE CBC W/AUTO DIFF WBC: CPT

## 2020-07-04 PROCEDURE — 96374 THER/PROPH/DIAG INJ IV PUSH: CPT

## 2020-07-04 PROCEDURE — 99283 EMERGENCY DEPT VISIT LOW MDM: CPT

## 2020-07-04 PROCEDURE — 36415 COLL VENOUS BLD VENIPUNCTURE: CPT

## 2020-07-04 RX ORDER — LEVETIRACETAM 250 MG/1
1000 TABLET, FILM COATED ORAL ONCE
Refills: 0 | Status: COMPLETED | OUTPATIENT
Start: 2020-07-04 | End: 2020-07-04

## 2020-07-04 RX ADMIN — LEVETIRACETAM 400 MILLIGRAM(S): 250 TABLET, FILM COATED ORAL at 03:14

## 2020-07-04 RX ADMIN — LEVETIRACETAM 1000 MILLIGRAM(S): 250 TABLET, FILM COATED ORAL at 03:29

## 2020-07-04 NOTE — ED PROVIDER NOTE - PATIENT PORTAL LINK FT
You can access the FollowMyHealth Patient Portal offered by [facility] by registering at the following website: [email or web address]. By joining [employer]’s FollowMyHealth portal, you will also be able to view your health information using other applications (apps) compatible with our system.

## 2020-07-04 NOTE — ED ADULT TRIAGE NOTE - CHIEF COMPLAINT QUOTE
Pt presents to er after having a seizure at home as per family, pt missed his evening dose of medication, pt with history of Epilepsy, pt sitting up, alert, speaking in clear full sentences at this time, A&OX4, respirations unlabored.

## 2020-07-04 NOTE — ED PROVIDER NOTE - CLINICAL SUMMARY MEDICAL DECISION MAKING FREE TEXT BOX
return to ed for intractable HA, persistent vomiting, or new onset motor/sensory deficits   see progress notes

## 2020-07-04 NOTE — ED PROVIDER NOTE - PHYSICAL EXAMINATION
neuro: CN II - XII intact, EOMI, PERRL, no papilledema, 5/5 muscle strength x 4 extremities, no sensory deficits, 2+ dtr globally, negative babinski, no ataxic gait, normal CAESAR and FNT, normal romberg

## 2020-07-04 NOTE — ED ADULT NURSE REASSESSMENT NOTE - NS ED NURSE REASSESS COMMENT FT1
patient able to ambulate independently with a steady gait while maintaining safety. patient continues to deny dizziness, headache, vision changes. full keppra dose given via IV. patient medically cleared to be discharged by dr. arriaga. discharge to be completed. friend driving patient home.

## 2020-07-04 NOTE — ED ADULT NURSE NOTE - OBJECTIVE STATEMENT
patient axox3, s/p witnessed seizure by wife at home. hx seizures. patient states he missed his evening dose of keppra as he was working late and may have missed his morning dose. patient denies headache, vision changes, aura, n/v/d, fever, chills, cough, chest pain, SOB. patient axox4, respirations even and unlabored.

## 2020-07-04 NOTE — ED PROVIDER NOTE - OBJECTIVE STATEMENT
pt presents to ED s/p seizure with no compalints. states "I was building a computer all day and I didn't take my keppra" at my evaluation denies fever. denies HA or neck pain. no chest pain or sob. no abd pain. no n/v/d. no urinary f/u/d. no back pain. no motor or sensory deficits. denies illicit drug use. no recent travel.

## 2020-07-04 NOTE — ED PROVIDER NOTE - PROGRESS NOTE DETAILS
ambulatying in nad no sing trauma has neuro f/u will load keppra pt has keppra at home. return to ed for intractable HA, persistent vomiting, or new onset motor/sensory deficits stressed imnportance of medciation complaince to pt

## 2020-08-21 ENCOUNTER — EMERGENCY (EMERGENCY)
Facility: HOSPITAL | Age: 30
LOS: 0 days | Discharge: HOME | End: 2020-08-21
Attending: EMERGENCY MEDICINE
Payer: COMMERCIAL

## 2020-08-21 VITALS
TEMPERATURE: 99 F | DIASTOLIC BLOOD PRESSURE: 76 MMHG | RESPIRATION RATE: 17 BRPM | HEART RATE: 106 BPM | OXYGEN SATURATION: 93 % | SYSTOLIC BLOOD PRESSURE: 121 MMHG | WEIGHT: 210.98 LBS | HEIGHT: 60 IN
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
## 2020-08-21 DIAGNOSIS — R00.0 TACHYCARDIA, UNSPECIFIED: ICD-10-CM

## 2020-08-21 DIAGNOSIS — G40.909 EPILEPSY, UNSPECIFIED, NOT INTRACTABLE, WITHOUT STATUS EPILEPTICUS: ICD-10-CM
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
## 2020-08-21 DIAGNOSIS — S00.81XA ABRASION OF OTHER PART OF HEAD, INITIAL ENCOUNTER: ICD-10-CM

## 2020-08-21 DIAGNOSIS — J45.909 UNSPECIFIED ASTHMA, UNCOMPLICATED: ICD-10-CM

## 2020-08-21 LAB
ALBUMIN SERPL ELPH-MCNC: 4.2 G/DL — SIGNIFICANT CHANGE UP (ref 3.3–5)
ALP SERPL-CCNC: 68 U/L — SIGNIFICANT CHANGE UP (ref 40–120)
ALT FLD-CCNC: 77 U/L — SIGNIFICANT CHANGE UP (ref 12–78)
ANION GAP SERPL CALC-SCNC: 5 MMOL/L — SIGNIFICANT CHANGE UP (ref 5–17)
AST SERPL-CCNC: 41 U/L — HIGH (ref 15–37)
BASOPHILS # BLD AUTO: 0.04 K/UL — SIGNIFICANT CHANGE UP (ref 0–0.2)
BASOPHILS NFR BLD AUTO: 0.5 % — SIGNIFICANT CHANGE UP (ref 0–2)
BILIRUB SERPL-MCNC: 0.5 MG/DL — SIGNIFICANT CHANGE UP (ref 0.2–1.2)
BUN SERPL-MCNC: 13 MG/DL — SIGNIFICANT CHANGE UP (ref 7–23)
CALCIUM SERPL-MCNC: 9.4 MG/DL — SIGNIFICANT CHANGE UP (ref 8.5–10.1)
CHLORIDE SERPL-SCNC: 107 MMOL/L — SIGNIFICANT CHANGE UP (ref 96–108)
CO2 SERPL-SCNC: 26 MMOL/L — SIGNIFICANT CHANGE UP (ref 22–31)
CREAT SERPL-MCNC: 1.15 MG/DL — SIGNIFICANT CHANGE UP (ref 0.5–1.3)
EOSINOPHIL # BLD AUTO: 0.17 K/UL — SIGNIFICANT CHANGE UP (ref 0–0.5)
EOSINOPHIL NFR BLD AUTO: 1.9 % — SIGNIFICANT CHANGE UP (ref 0–6)
GLUCOSE SERPL-MCNC: 102 MG/DL — HIGH (ref 70–99)
HCT VFR BLD CALC: 45.1 % — SIGNIFICANT CHANGE UP (ref 39–50)
HGB BLD-MCNC: 15.4 G/DL — SIGNIFICANT CHANGE UP (ref 13–17)
IMM GRANULOCYTES NFR BLD AUTO: 0.3 % — SIGNIFICANT CHANGE UP (ref 0–1.5)
LYMPHOCYTES # BLD AUTO: 3.28 K/UL — SIGNIFICANT CHANGE UP (ref 1–3.3)
LYMPHOCYTES # BLD AUTO: 37.6 % — SIGNIFICANT CHANGE UP (ref 13–44)
MCHC RBC-ENTMCNC: 29.4 PG — SIGNIFICANT CHANGE UP (ref 27–34)
MCHC RBC-ENTMCNC: 34.1 GM/DL — SIGNIFICANT CHANGE UP (ref 32–36)
MCV RBC AUTO: 86.2 FL — SIGNIFICANT CHANGE UP (ref 80–100)
MONOCYTES # BLD AUTO: 0.79 K/UL — SIGNIFICANT CHANGE UP (ref 0–0.9)
MONOCYTES NFR BLD AUTO: 9 % — SIGNIFICANT CHANGE UP (ref 2–14)
NEUTROPHILS # BLD AUTO: 4.42 K/UL — SIGNIFICANT CHANGE UP (ref 1.8–7.4)
NEUTROPHILS NFR BLD AUTO: 50.7 % — SIGNIFICANT CHANGE UP (ref 43–77)
PLATELET # BLD AUTO: 258 K/UL — SIGNIFICANT CHANGE UP (ref 150–400)
POTASSIUM SERPL-MCNC: 4 MMOL/L — SIGNIFICANT CHANGE UP (ref 3.5–5.3)
POTASSIUM SERPL-SCNC: 4 MMOL/L — SIGNIFICANT CHANGE UP (ref 3.5–5.3)
PROT SERPL-MCNC: 8.4 GM/DL — HIGH (ref 6–8.3)
RBC # BLD: 5.23 M/UL — SIGNIFICANT CHANGE UP (ref 4.2–5.8)
RBC # FLD: 12.5 % — SIGNIFICANT CHANGE UP (ref 10.3–14.5)
SODIUM SERPL-SCNC: 138 MMOL/L — SIGNIFICANT CHANGE UP (ref 135–145)
WBC # BLD: 8.73 K/UL — SIGNIFICANT CHANGE UP (ref 3.8–10.5)
WBC # FLD AUTO: 8.73 K/UL — SIGNIFICANT CHANGE UP (ref 3.8–10.5)

## 2020-08-21 PROCEDURE — 93005 ELECTROCARDIOGRAM TRACING: CPT

## 2020-08-21 PROCEDURE — 80053 COMPREHEN METABOLIC PANEL: CPT

## 2020-08-21 PROCEDURE — 85025 COMPLETE CBC W/AUTO DIFF WBC: CPT

## 2020-08-21 PROCEDURE — 96365 THER/PROPH/DIAG IV INF INIT: CPT

## 2020-08-21 PROCEDURE — 93010 ELECTROCARDIOGRAM REPORT: CPT

## 2020-08-21 PROCEDURE — 99284 EMERGENCY DEPT VISIT MOD MDM: CPT | Mod: 25

## 2020-08-21 PROCEDURE — 36415 COLL VENOUS BLD VENIPUNCTURE: CPT

## 2020-08-21 PROCEDURE — 99284 EMERGENCY DEPT VISIT MOD MDM: CPT

## 2020-08-21 RX ORDER — LEVETIRACETAM 250 MG/1
750 TABLET, FILM COATED ORAL ONCE
Refills: 0 | Status: COMPLETED | OUTPATIENT
Start: 2020-08-21 | End: 2020-08-21

## 2020-08-21 RX ORDER — SODIUM CHLORIDE 9 MG/ML
3 INJECTION INTRAMUSCULAR; INTRAVENOUS; SUBCUTANEOUS ONCE
Refills: 0 | Status: COMPLETED | OUTPATIENT
Start: 2020-08-21 | End: 2020-08-21

## 2020-08-21 RX ADMIN — LEVETIRACETAM 750 MILLIGRAM(S): 250 TABLET, FILM COATED ORAL at 19:01

## 2020-08-21 RX ADMIN — SODIUM CHLORIDE 3 MILLILITER(S): 9 INJECTION INTRAMUSCULAR; INTRAVENOUS; SUBCUTANEOUS at 18:24

## 2020-08-21 RX ADMIN — LEVETIRACETAM 400 MILLIGRAM(S): 250 TABLET, FILM COATED ORAL at 18:45

## 2020-08-21 NOTE — ED PROVIDER NOTE - PATIENT PORTAL LINK FT
You can access the FollowMyHealth Patient Portal offered by [facility] by registering at the following website: [email or web address]. By joining [employer]’s FollowMyHealth portal, you will also be able to view your health information using other applications (apps) compatible with our system.

## 2020-08-21 NOTE — ED PROVIDER NOTE - NSFOLLOWUPINSTRUCTIONS_ED_ALL_ED_FT
Continue your regular medications as per routine, especially your Keppra, don't miss any doses.  Follow up this upcoming week with your own neurologist.      ED evaluation and management discussed with the patient and family (if available) in detail.  Close PMD follow up encouraged.  Strict ED return instructions discussed in detail and patient given the opportunity to ask any questions about their discharge diagnosis and instructions. Patient verbalized understanding.        Epilepsy  Epilepsy is a condition in which a person has repeated seizures over time. A seizure is a sudden burst of abnormal electrical and chemical activity in the brain. Seizures can cause a change in attention, behavior, or the ability to remain awake and alert (altered mental status).  Epilepsy increases a person's risk of falls, accidents, and injury. It can also lead to complications, including:  Depression.Poor memory.Sudden unexplained death in epilepsy (SUDEP). This complication is rare, and its cause is not known.Most people with epilepsy lead normal lives.  What are the causes?  This condition may be caused by:  A head injury.An injury that happens at birth.A high fever during childhood.A stroke.Bleeding that goes into or around the brain.Certain medicines and drugs.Having too little oxygen for a long period of time.Abnormal brain development.Certain infections, such as meningitis and encephalitis.Brain tumors.Conditions that are passed from parent to child (are hereditary).What are the signs or symptoms?  Symptoms of a seizure vary greatly from person to person. They may include:  Convulsions.Stiffening of the body.Involuntary movements of the arms or legs.Loss of consciousness.Breathing problems.Falling suddenly.Confusion.Head nodding.Eye blinking or fluttering.Lip smacking.Drooling.Rapid eye movements.Grunting.Loss of bladder control and bowel control.Staring.Unresponsiveness.Some people have symptoms right before a seizure happens (aura) and right after a seizure happens. Symptoms of an aura include:  Fear or anxiety.Nausea.Feeling like the room is spinning (vertigo).A feeling of having seen or heard something before (déjà vu).Odd tastes or smells.Changes in vision, such as seeing flashing lights or spots.Symptoms that follow a seizure include:  Confusion.Sleepiness.Headache.How is this diagnosed?  This condition is diagnosed based on:  Your symptoms.Your medical history.A physical exam.A neurological exam. A neurological exam is similar to a physical exam. It involves checking your strength, reflexes, coordination, and sensations.Tests, such as:  A painless test that creates a diagram of your brain waves (electroencephalogram, orEEG).An MRI.A CT scan.A lumbar puncture, also called a spinal tap.Blood tests to check for signs of infection or abnormal blood chemistry.How is this treated?  Treatment can control seizures. Some types of epilepsy will need lifelong treatment, and some types go away in time. Treatment for this condition may involve:  Taking medicines to control seizures.Having a device called a vagus nerve stimulator implanted in the chest. The device sends electrical impulses to the vagus nerve and to the brain to prevent seizures. This treatment may be recommended if medicines do not help.Brain surgery. There are several kinds of surgeries that may be done to stop seizures from happening or to reduce how often seizures happen.Having regular blood tests. You may need to have blood tests regularly to check that you are getting the right amount of medicine.Once this condition has been diagnosed, it is important to begin treatment as soon as possible. For some people, epilepsy eventually goes away.  Follow these instructions at home:  Medicines     Take over-the-counter and prescription medicines only as told by your health care provider.Avoid any substances that may prevent your medicine from working properly, such as alcohol.Activity     Get enough rest. Lack of sleep can make seizures more likely to occur.Follow instructions from your health care provider about driving, swimming, and doing any other activities that would be dangerous if you had a seizure.  If you live in the [place] check with your local DMV (department of motor vehicles) to find out about local driving laws. Each state has specific rules about when you can legally return to driving.Educating others     Teach friends and family what to do if you have a seizure. They should:  Lay you on the ground to prevent a fall.Cushion your head and body.Loosen any tight clothing around your neck.Turn you on your side. If vomiting occurs, this helps keep your airway clear.Stay with you until you recover.Not hold you down. Holding you down will not stop the seizure.Not put anything in your mouth.Know whether or not you need emergency care.General instructions     Avoid anything that has ever triggered a seizure for you.Keep a seizure diary. Record what you remember about each seizure, especially anything that might have triggered the seizure.Keep all follow-up visits as told by your health care provider. This is important.Contact a health care provider if:  Your seizure pattern changes.You have symptoms of infection or another illness. This might increase your risk of having a seizure.Get help right away if:  You have:  A seizure that does not stop after 5 minutes.Several seizures in a row without a complete recovery between seizures.A seizure that makes it harder to breathe.A seizure that is different from previous seizures.A seizure that leaves you unable to speak or use a part of your body.You did not wake up immediately after a seizure.These symptoms may represent a serious problem that is an emergency. Do not wait to see if the symptoms will go away. Get medical help right away. Call your local emergency services (911 in the [place] Do not drive yourself to the hospital.   Summary  Epilepsy is a condition in which a person has repeated seizures over time. Some types of epilepsy will need lifelong treatment, and some types go away in time.Seizures can cause many symptoms, from brief staring spells or involuntary movements of the arms or legs to convulsions with loss of consciousness.Treatment is effective at controlling seizures. Take over-the-counter and prescription medicines only as told by your health care provider.Follow instructions from your health care provider about driving, swimming, and doing any other activities that would be dangerous if you had a seizure.Teach friends and family what to do if you have a seizure.This information is not intended to replace advice given to you by your health care provider. Make sure you discuss any questions you have with your health care provider.

## 2020-08-21 NOTE — ED ADULT TRIAGE NOTE - CHIEF COMPLAINT QUOTE
pt presents to ed via ems from, pt had witnessed seizure by wife. pt has hx of seizure, on kepra, unsure if took todays dose. pt alert and oriented x 2. pt talking in full sentences, no resp distress. pt states he feels tired. no episode of incontinence.

## 2020-08-21 NOTE — ED PROVIDER NOTE - OBJECTIVE STATEMENT
31 y/o male with a PMHx of seizure disorder on Keppra and asthma presents to ED s/p seizure. Pt states he recalls watching tv with his son and last thing he recalls is waking up in an ambulance. Currently c/o headache and abrasion on forehead. Pt may have accidentally missed a dose of keppra. Pt believes tetanus UTD. No illicit drug use.

## 2020-08-21 NOTE — ED ADULT NURSE NOTE - OBJECTIVE STATEMENT
patient axox3, s/p seizure at home. + LOC, + head strike, abrasion noted to right forehead. patient denies aura, headache, n/v/d, fever, chills, cough, chest pain, SOB. patient takes keppra x2 day and believes he might have missed last night's dose. vital signs stable in room. patient states he is legally blind, denies vision changes from baseline.

## 2020-08-21 NOTE — ED PROVIDER NOTE - CLINICAL SUMMARY MEDICAL DECISION MAKING FREE TEXT BOX
29 y/o white male hx of asthma and seizure disorder on Keppra BIBA from home s/p witnessed seizure episode, pt acknowledges missed couple of doses recently. +minimal headache, +forehead abrasion. neuro intact. Plan: IV Keppra dose, basic labs, observe, reassess, ambulation trial, expect discharge home.

## 2020-08-21 NOTE — ED PROVIDER NOTE - CONSTITUTIONAL, MLM
normal... white male adult, alert, no acute distress, forehead +abrasion, no evidence of skull deformity

## 2020-08-21 NOTE — ED PROVIDER NOTE - MUSCULOSKELETAL, MLM
Spine appears normal, range of motion is not limited, no muscle or joint tenderness. Back nontender stable. Neck nontender, supple. Pelvis nontender, stable. STEVENS x4, no focal extremity swelling or deformity.

## 2020-08-21 NOTE — ED PROVIDER NOTE - ENMT, MLM
Airway patent, Nasal mucosa clear. Throat has no vesicles, no oropharyngeal exudates and uvula is midline. No clinical evidence of facial injury. +Small abrasion left lateral aspect of tongue, no active bleeding.

## 2020-08-21 NOTE — ED ADULT NURSE REASSESSMENT NOTE - NS ED NURSE REASSESS COMMENT FT1
patient able to ambulate independently with a steady gait while maintaining safety. patient medically discharged by dr. harrington. discharge to be completed. family picking up patient from hospital.

## 2020-08-21 NOTE — ED ADULT NURSE NOTE - NSIMPLEMENTINTERV_GEN_ALL_ED
Implemented All Fall with Harm Risk Interventions:  Shushan to call system. Call bell, personal items and telephone within reach. Instruct patient to call for assistance. Room bathroom lighting operational. Non-slip footwear when patient is off stretcher. Physically safe environment: no spills, clutter or unnecessary equipment. Stretcher in lowest position, wheels locked, appropriate side rails in place. Provide visual cue, wrist band, yellow gown, etc. Monitor gait and stability. Monitor for mental status changes and reorient to person, place, and time. Review medications for side effects contributing to fall risk. Reinforce activity limits and safety measures with patient and family. Provide visual clues: red socks.

## 2020-08-24 NOTE — ED ADULT NURSE NOTE - NSHOSCREENINGQ1_ED_ALL_ED
"  Cardiothoracic - Vascular Surgery Daily Note        LOS: 4 days   Patient Care Team:  Rosario Mckeon MD as PCP - General (Family Medicine)    POD#4 LEFT VATS. Lower lobectomy    Chief Complaint: Lung Nodule      Subjective     The following portions of the patient's history were reviewed and updated as appropriate: allergies, current medications, past family history, past medical history, past social history, past surgical history and problem list.     Subjective:  Symptoms:  Stable.    Diet:  Adequate intake.    Activity level: Returning to normal.    Pain:  She complains of pain that is mild.  Pain is well controlled and requiring pain medication.          Objective     Vital Signs  Temp:  [97.5 °F (36.4 °C)-98.6 °F (37 °C)] 97.5 °F (36.4 °C)  Heart Rate:  [64-80] 80  Resp:  [18] 18  BP: (118-135)/(62-79) 125/67  Body mass index is 22.69 kg/m².    Intake/Output Summary (Last 24 hours) at 8/24/2020 1024  Last data filed at 8/24/2020 0636  Gross per 24 hour   Intake --   Output 40 ml   Net -40 ml     No intake/output data recorded.    Wt Readings from Last 3 Encounters:   08/24/20 58.1 kg (128 lb 1.6 oz)   08/17/20 57.6 kg (127 lb)   08/10/20 57.7 kg (127 lb 3.2 oz)     CT 140cc serosanginous  Clamped no ptx    Physical Exam   Objective:  General Appearance:  Well-appearing, comfortable, in no acute distress and in pain.    Vital signs: (most recent): Blood pressure 125/67, pulse 80, temperature 97.5 °F (36.4 °C), temperature source Temporal, resp. rate 18, height 160 cm (63\"), weight 58.1 kg (128 lb 1.6 oz), SpO2 94 %.  Vital signs are normal.    Output: Producing urine and no stool output.    HEENT: Normal HEENT exam.    Lungs:  Normal effort and normal respiratory rate.  Breath sounds clear to auscultation.  (CT no air leak serous drainage around CT site)  Heart: Normal rate.  Regular rhythm.    Abdomen: Abdomen is soft.  Bowel sounds are normal.     Extremities: Normal range of motion.    Neurological: " Patient is alert and oriented to person, place and time.  GCS score is 15.    Pupils:  Pupils are equal, round, and reactive to light.  Pupils are equal.   Skin:  Warm and dry.  ((L) Thor: Prevena Intact)              Results Review:    Lab Results   Component Value Date    WBC 9.18 08/21/2020    HGB 11.0 (L) 08/21/2020    HCT 31.3 (L) 08/21/2020    MCV 94.3 08/21/2020     08/21/2020     Lab Results   Component Value Date    GLUCOSE 93 08/21/2020    BUN 6 08/21/2020    CREATININE 0.67 08/21/2020    EGFRIFNONA 91 08/21/2020    BCR 9.0 08/21/2020    K 4.2 08/21/2020    CO2 23.0 08/21/2020    CALCIUM 7.9 (L) 08/21/2020       Calcium No results found for: CALCIUM   Magnesium No results found for: MG     Imaging Results (Last 24 Hours)     Procedure Component Value Units Date/Time    XR Chest 2 View [[id number]] Collected:  08/24/20 0526     Updated:  08/24/20 0638    Narrative:       EXAM:    XR Chest, 2 Views    CLINICAL HISTORY:    The patient is 58 years old and is Female; CT clamped, R91.1  Solitary pulmonary nodule    TECHNIQUE:    Frontal and lateral views of the chest.    COMPARISON:    Chest radiograph August 23, 2020    FINDINGS:    LUNGS:  Opacity within the left lower lobe is present and  grossly stable. The right lung is clear.    PLEURAL SPACE:  No definite pneumothorax is seen.    HEART:  Unremarkable.  No cardiomegaly.    MEDIASTINUM:  Unremarkable.    BONES/JOINTS:  Unremarkable.    TUBES, LINES AND DEVICES:  Left-sided chest tube is in place.    UPPER ABDOMEN:  Gaseous distention of visualized bowel is  present.      Impression:         Left-sided chest tube in place without evidence of  pneumothorax. Persistent atelectasis within the left lung base is  noted. The overall appearance is stable.    Electronically signed by:  Iqra Meyers MD  8/24/2020 6:37 AM  CDT Workstation: [id number]                                  acetaminophen 1,000 mg Oral Q6H   escitalopram 20 mg Oral Daily   famotidine 20  mg Oral BID AC   fluticasone 1 spray Nasal Daily   nicotine 1 patch Transdermal Q24H   polyethylene glycol 17 g Oral Daily   pravastatin 40 mg Oral Nightly   sennosides-docusate 2 tablet Oral Nightly   silver sulfadiazine  Topical Q24H                ASSESSMENT/PLAN       Lung nodule      Assessment & Plan    Stable Post Op LEFT VATS: Progressing well    Lung Nodule: lower lobectomy. + adenocarcinoma. Await final path for staging    Respiratory: O2 support. Incentive. CXR stable, CT removed, plan follow up CXR    Depression: home lexapro    Pain: Scheduled Tylenol. Oxycodone immediate release, acetominophen, toradol.     Rehab: OOB chair. Ambulate    Disposition: Stable for D/C home today              This document has been electronically signed by MICHAEL Murdock on August 24, 2020 10:24         No

## 2020-09-30 NOTE — ED ADULT NURSE NOTE - TEMPLATE
Blood orders re-ordered to [facility].  Unable to do FOBt at [facility], will need to be done here.  Please notify patient.   Dental

## 2021-01-21 NOTE — ED ADULT NURSE NOTE - PRIMARY CARE PROVIDER
dr. denis Colchicine Counseling:  Patient counseled regarding adverse effects including but not limited to stomach upset (nausea, vomiting, stomach pain, or diarrhea).  Patient instructed to limit alcohol consumption while taking this medication.  Colchicine may reduce blood counts especially with prolonged use.  The patient understands that monitoring of kidney function and blood counts may be required, especially at baseline. The patient verbalized understanding of the proper use and possible adverse effects of colchicine.  All of the patient's questions and concerns were addressed.

## 2021-01-25 NOTE — ED ADULT NURSE NOTE - NS ED NURSE RECORD ANOTHER VITAL SIGN
[employer]      Corina Isabel is a 2 m.o. female here for well child exam with her mother     PARENT/PATIENT CONCERNS    Rash to face    Stool concerns. Forms?: no   School/work notes?:no   Refills? :No       Adverse reaction to immunization at birth? no    REVIEW OF LIFESTYLE  Always sleeps on back?:  Yes  Always sleeps in a crib or bassinette?:  Yes  Any blankets, toys, bumpers, or pillows in the crib?: Yes  Sleeps in parents' bed?: No  Rides in a rear-facing car seat?: Yes  Has working smoke alarms at home?:  Yes  Carbon monoxide detectors in home?: Yes     setting:  in home: primary caregiver is mother    Mom has been feeling sad, anxious, hopeless or depressed?: no      DIET HISTORY  Formula: Enfamil   Amount: 4 oz every 3-4 hours   How long does it take for the infant to finish a bottle?: 5-10   Baby is held when being fed?: Yes  Spitting up:  mild  Feeding how many times through the night?: 2    Birth History    Birth     Weight: 7 lb 3 oz (3.26 kg)    Apgar     One: 8.0     Five: 9.0    Delivery Method: Vaginal, Spontaneous    Gestation Age: 44 wks   [address] [facility] [address] [facility] [address] Name: [place]     Normal  hearing screen  CCHD normal       ROS  Constitutional:  Denies fever. Sleeping normally. Easily consolable. Eyes:  Denies eye drainage or redness, no concerns for vision. HENT:  Denies nasal congestion or ear drainage, no concerns for hearing. Respiratory:  Denies cough or troubles breathing. Cardiovascular:  Denies cyanosis or extremity swelling. No difficulty feeding   GI:  Denies vomiting, bloody stools, constipation, or diarrhea. Child is feeding well   :  Denies decrease in urination. Good number of wet diapers. No blood noted. Musculoskeletal:  Denies joint redness or swelling. Normal movement of extremities. Integument:  Denies rash   Neurologic:  Denies focal weakness, no altered level of consciousness  Lymphatic:  Denies swollen glands or edema.      Wt Readings from Last 2 Encounters:   01/25/21 11 lb 7 oz (5.188 kg) (52 %, Z= 0.05)*   01/04/21 10 lb 8.5 oz (4.777 kg) (66 %, Z= 0.42)*     * Growth percentiles are based on WHO (Girls, 0-2 years) data. PHYSICAL EXAM    Vital signs: Pulse 168 Comment: crying  Temp 97 °F (36.1 °C) (Infrared)   Resp 42   Ht 22.5\" (57.2 cm)   Wt 11 lb 7 oz (5.188 kg)   HC 37.3 cm (14.69\")   BMI 15.88 kg/m²  52 %ile (Z= 0.05) based on WHO (Girls, 0-2 years) weight-for-age data using vitals from 1/25/2021. 50 %ile (Z= -0.01) based on WHO (Girls, 0-2 years) Length-for-age data based on Length recorded on 1/25/2021. General:  Alert, interactive and appropriate, well-nourished, well-appearing  Head:  Normocephalic with soft flat anterior fontanel  Eyes:  No drainage, conjunctiva not injected. Eyelids without swelling or erythema. EOMs appropriate for age, PERRL. Bilateral red reflex. Ears:  Helices well formed, ears in normal position. TMs normal.  Nose:  Nares normal, no drainage  Mouth:  Oropharynx normal, mucous membranes pink and moist. Skin intact without lesions, no tooth eruption  Neck:  Symmetric, supple, full range of motion, no tenderness, no masses. Chest:  Symmetrical  Respiratory:  No grunting, flaring or retractions. Normal respiratory rate without labor. Chest clear to auscultation. Heart:  Regular rate and rhythm, normal S1 and S2, femoral pulses full and symmetric. No brachial-femoral delay. Brisk cap refill  Murmur:  no murmur noted  Abdomen:  Soft, nontender, nondistended, normal bowel sounds, no hepatosplenomegaly or abnormal masses, umbilicus normal without hernia. Genitals:  normal female and brigido stage 1  Lymphatic:  No cervical, inguinal, or axillary adenopahy. Musculoskeletal:  Back straight and symmetric, no midline defects, Hips with negative Ortolani and Casey. Hips with normal and symmetric range of motion. Leg length symmetric. Skin:  No rashes, lesions, indurations, or cyanosis.  Small nevus flammeus to left nasal bridge, nape of neck, right labia majora, and upper lip  Neuro: Normal leodan, suck, rooting, plantar, and palmar reflexes. Normal tone and movement bilaterally   Psychosocial: Parents holding infant, interested, asking appropriate questions, loving toward infant     DEVELOPMENTAL EXAM  Person and vocalizes reciprocally?: Yes  Attentive to voices?: Yes  Smiles socially?: Yes  When prone, can lift head, neck, and upper chest with support on forearms?: Yes  Increasing head control in upright position?:  Yes       IMMUNES  Immunization History   Administered Date(s) Administered    Hepatitis B Ped/Adol (Engerix-B, Recombivax HB) 2020       IMPRESSION/PLAN    1. Health check for child over 34 days old    2. Need for vaccination    3. Milk protein intolerance    4. Nevus flammeus    5. Acquired positional plagiocephaly    6. Gastroesophageal reflux disease without esophagitis        Healthy 3month old    GERD, milk protein intolerance: Significant improvement in spit up and fussiness. Doing excellent on Nutramigen and famotidine 0.3 mL once daily. Continue current regimen, call as needed, plan to wean off famotidine if doing well at 4-month checkup.   Reassurance that she has excellent weight gain    Nevus flammeus    Right plagiocephaly: Improving, no evidence of torticollis, continue to encourage her to look to the left when possible with repositioning, increased tummy time and time spent in upright position    Dry skin dermatitis to face: Recommend hypoallergenic soap and moisturize twice daily with white fragrance free cream.  Sample of Eucerin body wash and Eucerin body cream given, call with concerns    Next well child visit per routine in 2 months  Anticipatory guidance discussed or covered in handout given to family:   Common immunization side effects   Nutrition and feeding   Teething   Safety:  No smoking, falls,  Rear-facing car seat, safe toys, CO monitor, smoke  alarms   Back to sleep   Acetaminophen dose (10-15 mg/kg)   Choke hazards   Immunes this visit    Consider MVI with iron and/or vitamin D (400IU/day) supplement if breast fed and getting less than 16 oz of formula per day    Orders Placed This Encounter   Procedures    DTaP HiB IPV (age 6w-4y) IM (Pentacel)    PREVNAR 13 IM (Pneumococcal conjugate vaccine 13-valent)    Rotavirus vaccine pentavalent 3 dose oral (ROTATEQ)    Hep B Vaccine Ped/Adol 3-Dose (ENGERIX-B)     Return in about 2 months (around 3/25/2021) for well child exam, immunizations. I have reviewed and agree with documentation per clinical staff, and have made any necessary adjustments.   Electronically signed by KHOI Howard CNP on 1/25/2021 at 2:18 PM (Please note that portions of this note were completed with a voice recognition program. Efforts were made to edit the dictations, but occasionally words are mis-transcribed.) Yes

## 2021-05-17 ENCOUNTER — APPOINTMENT (OUTPATIENT)
Dept: DISASTER EMERGENCY | Facility: OTHER | Age: 31
End: 2021-05-17
Payer: COMMERCIAL

## 2021-05-17 PROCEDURE — 0012A: CPT

## 2021-06-16 ENCOUNTER — EMERGENCY (EMERGENCY)
Facility: HOSPITAL | Age: 31
LOS: 0 days | Discharge: HOME | End: 2021-06-16
Attending: EMERGENCY MEDICINE
Payer: COMMERCIAL

## 2021-06-16 VITALS
DIASTOLIC BLOOD PRESSURE: 68 MMHG | HEART RATE: 107 BPM | SYSTOLIC BLOOD PRESSURE: 140 MMHG | WEIGHT: 210.1 LBS | HEIGHT: 60 IN | TEMPERATURE: 98 F | OXYGEN SATURATION: 93 % | RESPIRATION RATE: 18 BRPM

## 2021-06-16 DIAGNOSIS — R51.9 HEADACHE, UNSPECIFIED: ICD-10-CM

## 2021-06-16 DIAGNOSIS — Z86.69 PERSONAL HISTORY OF OTHER DISEASES OF THE NERVOUS SYSTEM AND SENSE ORGANS: ICD-10-CM

## 2021-06-16 DIAGNOSIS — R56.9 UNSPECIFIED CONVULSIONS: ICD-10-CM

## 2021-06-16 PROCEDURE — 99284 EMERGENCY DEPT VISIT MOD MDM: CPT

## 2021-06-16 PROCEDURE — 99284 EMERGENCY DEPT VISIT MOD MDM: CPT | Mod: 25

## 2021-06-16 PROCEDURE — 70450 CT HEAD/BRAIN W/O DYE: CPT

## 2021-06-16 PROCEDURE — 70450 CT HEAD/BRAIN W/O DYE: CPT | Mod: 26,MG

## 2021-06-16 PROCEDURE — G1004: CPT

## 2021-06-16 NOTE — ED PROVIDER NOTE - OBJECTIVE STATEMENT
32 y/o male in ED s/p sz tonight lasting approx 1 minute.   pt states he was sitting on couch with brother in law when sz occurred.,  pt states has not had sz in over 1 yr.   states has not taken his evening dose of his keppra.   pt denies any fever, neck pain, cp, sob, n/v/d/abd pain.   states had HA after sz now resolved.

## 2021-06-16 NOTE — ED PROVIDER NOTE - PATIENT PORTAL LINK FT
You can access the FollowMyHealth Patient Portal offered by [facility] by registering at the following website: [email or web address]. By joining [employer]’s FollowMyHealth portal, you will also be able to view your health information using other applications (apps) compatible with our system.
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
## 2021-06-16 NOTE — ED PROVIDER NOTE - NSFOLLOWUPINSTRUCTIONS_ED_ALL_ED_FT
please follow up with your doctor in 2-3 days.   continue with medication as prescribed by your doctor.   return to ED for any concerns

## 2021-06-21 ENCOUNTER — EMERGENCY (EMERGENCY)
Facility: HOSPITAL | Age: 31
LOS: 0 days | Discharge: HOME | End: 2021-06-21
Attending: EMERGENCY MEDICINE
Payer: COMMERCIAL

## 2021-06-21 VITALS
OXYGEN SATURATION: 99 % | RESPIRATION RATE: 18 BRPM | TEMPERATURE: 98 F | SYSTOLIC BLOOD PRESSURE: 131 MMHG | DIASTOLIC BLOOD PRESSURE: 71 MMHG | HEART RATE: 99 BPM

## 2021-06-21 VITALS
SYSTOLIC BLOOD PRESSURE: 152 MMHG | RESPIRATION RATE: 16 BRPM | OXYGEN SATURATION: 92 % | DIASTOLIC BLOOD PRESSURE: 91 MMHG | HEIGHT: 68 IN | HEART RATE: 120 BPM | WEIGHT: 240.08 LBS | TEMPERATURE: 99 F
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
## 2021-06-21 DIAGNOSIS — R56.9 UNSPECIFIED CONVULSIONS: ICD-10-CM

## 2021-06-21 DIAGNOSIS — M54.2 CERVICALGIA: ICD-10-CM
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
## 2021-06-21 DIAGNOSIS — S80.819A ABRASION, UNSPECIFIED LOWER LEG, INITIAL ENCOUNTER: ICD-10-CM

## 2021-06-21 DIAGNOSIS — M25.511 PAIN IN RIGHT SHOULDER: ICD-10-CM

## 2021-06-21 LAB
ALBUMIN SERPL ELPH-MCNC: 4 G/DL — SIGNIFICANT CHANGE UP (ref 3.3–5)
ALP SERPL-CCNC: 77 U/L — SIGNIFICANT CHANGE UP (ref 40–120)
ALT FLD-CCNC: 98 U/L — HIGH (ref 12–78)
ANION GAP SERPL CALC-SCNC: 7 MMOL/L — SIGNIFICANT CHANGE UP (ref 5–17)
APPEARANCE UR: CLEAR — SIGNIFICANT CHANGE UP
AST SERPL-CCNC: 52 U/L — HIGH (ref 15–37)
BASOPHILS # BLD AUTO: 0.02 K/UL — SIGNIFICANT CHANGE UP (ref 0–0.2)
BASOPHILS NFR BLD AUTO: 0.2 % — SIGNIFICANT CHANGE UP (ref 0–2)
BILIRUB SERPL-MCNC: 0.6 MG/DL — SIGNIFICANT CHANGE UP (ref 0.2–1.2)
BILIRUB UR-MCNC: NEGATIVE — SIGNIFICANT CHANGE UP
BUN SERPL-MCNC: 11 MG/DL — SIGNIFICANT CHANGE UP (ref 7–23)
CALCIUM SERPL-MCNC: 8.8 MG/DL — SIGNIFICANT CHANGE UP (ref 8.5–10.1)
CHLORIDE SERPL-SCNC: 108 MMOL/L — SIGNIFICANT CHANGE UP (ref 96–108)
CO2 SERPL-SCNC: 23 MMOL/L — SIGNIFICANT CHANGE UP (ref 22–31)
COLOR SPEC: YELLOW — SIGNIFICANT CHANGE UP
CREAT SERPL-MCNC: 1.16 MG/DL — SIGNIFICANT CHANGE UP (ref 0.5–1.3)
DIFF PNL FLD: ABNORMAL
EOSINOPHIL # BLD AUTO: 0.1 K/UL — SIGNIFICANT CHANGE UP (ref 0–0.5)
EOSINOPHIL NFR BLD AUTO: 1.1 % — SIGNIFICANT CHANGE UP (ref 0–6)
GLUCOSE SERPL-MCNC: 90 MG/DL — SIGNIFICANT CHANGE UP (ref 70–99)
GLUCOSE UR QL: NEGATIVE MG/DL — SIGNIFICANT CHANGE UP
HCT VFR BLD CALC: 42.8 % — SIGNIFICANT CHANGE UP (ref 39–50)
HGB BLD-MCNC: 15 G/DL — SIGNIFICANT CHANGE UP (ref 13–17)
IMM GRANULOCYTES NFR BLD AUTO: 0.3 % — SIGNIFICANT CHANGE UP (ref 0–1.5)
KETONES UR-MCNC: ABNORMAL
LEUKOCYTE ESTERASE UR-ACNC: NEGATIVE — SIGNIFICANT CHANGE UP
LYMPHOCYTES # BLD AUTO: 3.18 K/UL — SIGNIFICANT CHANGE UP (ref 1–3.3)
LYMPHOCYTES # BLD AUTO: 35.5 % — SIGNIFICANT CHANGE UP (ref 13–44)
MAGNESIUM SERPL-MCNC: 2.3 MG/DL — SIGNIFICANT CHANGE UP (ref 1.6–2.6)
MCHC RBC-ENTMCNC: 29.9 PG — SIGNIFICANT CHANGE UP (ref 27–34)
MCHC RBC-ENTMCNC: 35 GM/DL — SIGNIFICANT CHANGE UP (ref 32–36)
MCV RBC AUTO: 85.3 FL — SIGNIFICANT CHANGE UP (ref 80–100)
MONOCYTES # BLD AUTO: 0.77 K/UL — SIGNIFICANT CHANGE UP (ref 0–0.9)
MONOCYTES NFR BLD AUTO: 8.6 % — SIGNIFICANT CHANGE UP (ref 2–14)
NEUTROPHILS # BLD AUTO: 4.87 K/UL — SIGNIFICANT CHANGE UP (ref 1.8–7.4)
NEUTROPHILS NFR BLD AUTO: 54.3 % — SIGNIFICANT CHANGE UP (ref 43–77)
NITRITE UR-MCNC: NEGATIVE — SIGNIFICANT CHANGE UP
PH UR: 5 — SIGNIFICANT CHANGE UP (ref 5–8)
PLATELET # BLD AUTO: 267 K/UL — SIGNIFICANT CHANGE UP (ref 150–400)
POTASSIUM SERPL-MCNC: 4.2 MMOL/L — SIGNIFICANT CHANGE UP (ref 3.5–5.3)
POTASSIUM SERPL-SCNC: 4.2 MMOL/L — SIGNIFICANT CHANGE UP (ref 3.5–5.3)
PROT SERPL-MCNC: 8.2 GM/DL — SIGNIFICANT CHANGE UP (ref 6–8.3)
PROT UR-MCNC: 30 MG/DL
RBC # BLD: 5.02 M/UL — SIGNIFICANT CHANGE UP (ref 4.2–5.8)
RBC # FLD: 12.7 % — SIGNIFICANT CHANGE UP (ref 10.3–14.5)
SODIUM SERPL-SCNC: 138 MMOL/L — SIGNIFICANT CHANGE UP (ref 135–145)
SP GR SPEC: 1.02 — SIGNIFICANT CHANGE UP (ref 1.01–1.02)
UROBILINOGEN FLD QL: NEGATIVE MG/DL — SIGNIFICANT CHANGE UP
WBC # BLD: 8.97 K/UL — SIGNIFICANT CHANGE UP (ref 3.8–10.5)
WBC # FLD AUTO: 8.97 K/UL — SIGNIFICANT CHANGE UP (ref 3.8–10.5)

## 2021-06-21 PROCEDURE — 96360 HYDRATION IV INFUSION INIT: CPT

## 2021-06-21 PROCEDURE — 93005 ELECTROCARDIOGRAM TRACING: CPT

## 2021-06-21 PROCEDURE — 81001 URINALYSIS AUTO W/SCOPE: CPT

## 2021-06-21 PROCEDURE — 99284 EMERGENCY DEPT VISIT MOD MDM: CPT | Mod: 25

## 2021-06-21 PROCEDURE — 71045 X-RAY EXAM CHEST 1 VIEW: CPT | Mod: 26

## 2021-06-21 PROCEDURE — 99284 EMERGENCY DEPT VISIT MOD MDM: CPT

## 2021-06-21 PROCEDURE — 96361 HYDRATE IV INFUSION ADD-ON: CPT

## 2021-06-21 PROCEDURE — 80053 COMPREHEN METABOLIC PANEL: CPT

## 2021-06-21 PROCEDURE — 71045 X-RAY EXAM CHEST 1 VIEW: CPT

## 2021-06-21 PROCEDURE — 83735 ASSAY OF MAGNESIUM: CPT

## 2021-06-21 PROCEDURE — 36415 COLL VENOUS BLD VENIPUNCTURE: CPT

## 2021-06-21 PROCEDURE — 85025 COMPLETE CBC W/AUTO DIFF WBC: CPT

## 2021-06-21 PROCEDURE — 93010 ELECTROCARDIOGRAM REPORT: CPT

## 2021-06-21 PROCEDURE — 82962 GLUCOSE BLOOD TEST: CPT

## 2021-06-21 PROCEDURE — 80177 DRUG SCRN QUAN LEVETIRACETAM: CPT

## 2021-06-21 RX ORDER — TOPIRAMATE 25 MG
25 TABLET ORAL ONCE
Refills: 0 | Status: COMPLETED | OUTPATIENT
Start: 2021-06-21 | End: 2021-06-21

## 2021-06-21 RX ORDER — SODIUM CHLORIDE 9 MG/ML
1000 INJECTION INTRAMUSCULAR; INTRAVENOUS; SUBCUTANEOUS ONCE
Refills: 0 | Status: COMPLETED | OUTPATIENT
Start: 2021-06-21 | End: 2021-06-21

## 2021-06-21 RX ORDER — TOPIRAMATE 25 MG
1 TABLET ORAL
Qty: 60 | Refills: 0
Start: 2021-06-21 | End: 2021-07-20

## 2021-06-21 RX ORDER — LEVETIRACETAM 250 MG/1
1500 TABLET, FILM COATED ORAL ONCE
Refills: 0 | Status: COMPLETED | OUTPATIENT
Start: 2021-06-21 | End: 2021-06-21

## 2021-06-21 RX ADMIN — Medication 25 MILLIGRAM(S): at 21:19

## 2021-06-21 RX ADMIN — SODIUM CHLORIDE 2000 MILLILITER(S): 9 INJECTION INTRAMUSCULAR; INTRAVENOUS; SUBCUTANEOUS at 17:26

## 2021-06-21 RX ADMIN — LEVETIRACETAM 1500 MILLIGRAM(S): 250 TABLET, FILM COATED ORAL at 21:19

## 2021-06-21 RX ADMIN — SODIUM CHLORIDE 1000 MILLILITER(S): 9 INJECTION INTRAMUSCULAR; INTRAVENOUS; SUBCUTANEOUS at 21:20

## 2021-06-21 RX ADMIN — SODIUM CHLORIDE 1000 MILLILITER(S): 9 INJECTION INTRAMUSCULAR; INTRAVENOUS; SUBCUTANEOUS at 18:15
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
## 2021-06-21 NOTE — ED PROVIDER NOTE - PROGRESS NOTE DETAILS
discussed case with Dr Quevedo for neuro.  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  keppra level pending.  will follow up with neurologist tomorrow.  any further seizure should return to ER for further eval.  no driving until cleared by neuro.  pt and sig other at bedside agree with plan.

## 2021-06-21 NOTE — ED ADULT TRIAGE NOTE - BSA (M2)
2.21 [Follow-Up: _____] : a [unfilled] follow-up visit [Family Member] : family member [FreeTextEntry1] : brain tumor

## 2021-06-21 NOTE — ED PROVIDER NOTE - PATIENT PORTAL LINK FT
You can access the FollowMyHealth Patient Portal offered by [facility] by registering at the following website: [email or web address]. By joining [employer]’s FollowMyHealth portal, you will also be able to view your health information using other applications (apps) compatible with our system.

## 2021-06-21 NOTE — ED PROVIDER NOTE - CLINICAL SUMMARY MEDICAL DECISION MAKING FREE TEXT BOX
Discuss case w Dr. Quevedo for neurology he recommended Depakote 250 BID and following up with primary neurologist tm. If pt is uncomfortable, can be admitted for EEG. Discuss case w Dr. Quevedo for neurology he recommended topamax 25 BID and following up with primary neurologist tm. If pt is uncomfortable, can be admitted for EEG.

## 2021-06-21 NOTE — ED PROVIDER NOTE - NSFOLLOWUPINSTRUCTIONS_ED_ALL_ED_FT
Follow up with your neurologist tomorrow  COntinue keppra as prescribed  Start Topamax in addition, 25mg twice daily  Any further seizure please return to ER for further evaluation  No driving until cleared by neurology.
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
## 2021-06-24 LAB — LEVETIRACETAM SERPL-MCNC: 21.1 UG/ML — SIGNIFICANT CHANGE UP (ref 10–40)

## 2021-08-18 ENCOUNTER — EMERGENCY (EMERGENCY)
Facility: HOSPITAL | Age: 31
LOS: 0 days | Discharge: HOME | End: 2021-08-19
Attending: STUDENT IN AN ORGANIZED HEALTH CARE EDUCATION/TRAINING PROGRAM
Payer: COMMERCIAL

## 2021-08-18 VITALS — WEIGHT: 229.94 LBS | HEIGHT: 68 IN

## 2021-08-18 DIAGNOSIS — G40.909 EPILEPSY, UNSPECIFIED, NOT INTRACTABLE, WITHOUT STATUS EPILEPTICUS: ICD-10-CM
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
## 2021-08-18 DIAGNOSIS — S09.90XA UNSPECIFIED INJURY OF HEAD, INITIAL ENCOUNTER: ICD-10-CM

## 2021-08-18 DIAGNOSIS — R56.9 UNSPECIFIED CONVULSIONS: ICD-10-CM

## 2021-08-18 DIAGNOSIS — Z23 ENCOUNTER FOR IMMUNIZATION: ICD-10-CM
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
## 2021-08-18 LAB
ALBUMIN SERPL ELPH-MCNC: 4.2 G/DL — SIGNIFICANT CHANGE UP (ref 3.3–5)
ALP SERPL-CCNC: 93 U/L — SIGNIFICANT CHANGE UP (ref 40–120)
ALT FLD-CCNC: 60 U/L — SIGNIFICANT CHANGE UP (ref 12–78)
ANION GAP SERPL CALC-SCNC: 6 MMOL/L — SIGNIFICANT CHANGE UP (ref 5–17)
AST SERPL-CCNC: 34 U/L — SIGNIFICANT CHANGE UP (ref 15–37)
BASOPHILS # BLD AUTO: 0.04 K/UL — SIGNIFICANT CHANGE UP (ref 0–0.2)
BASOPHILS NFR BLD AUTO: 0.4 % — SIGNIFICANT CHANGE UP (ref 0–2)
BILIRUB SERPL-MCNC: 0.5 MG/DL — SIGNIFICANT CHANGE UP (ref 0.2–1.2)
BUN SERPL-MCNC: 15 MG/DL — SIGNIFICANT CHANGE UP (ref 7–23)
CALCIUM SERPL-MCNC: 8.8 MG/DL — SIGNIFICANT CHANGE UP (ref 8.5–10.1)
CHLORIDE SERPL-SCNC: 110 MMOL/L — HIGH (ref 96–108)
CO2 SERPL-SCNC: 24 MMOL/L — SIGNIFICANT CHANGE UP (ref 22–31)
CREAT SERPL-MCNC: 1.08 MG/DL — SIGNIFICANT CHANGE UP (ref 0.5–1.3)
EOSINOPHIL # BLD AUTO: 0.06 K/UL — SIGNIFICANT CHANGE UP (ref 0–0.5)
EOSINOPHIL NFR BLD AUTO: 0.5 % — SIGNIFICANT CHANGE UP (ref 0–6)
GLUCOSE SERPL-MCNC: 102 MG/DL — HIGH (ref 70–99)
HCT VFR BLD CALC: 44 % — SIGNIFICANT CHANGE UP (ref 39–50)
HGB BLD-MCNC: 15.1 G/DL — SIGNIFICANT CHANGE UP (ref 13–17)
IMM GRANULOCYTES NFR BLD AUTO: 0.3 % — SIGNIFICANT CHANGE UP (ref 0–1.5)
LYMPHOCYTES # BLD AUTO: 28 % — SIGNIFICANT CHANGE UP (ref 13–44)
LYMPHOCYTES # BLD AUTO: 3.19 K/UL — SIGNIFICANT CHANGE UP (ref 1–3.3)
MCHC RBC-ENTMCNC: 29.8 PG — SIGNIFICANT CHANGE UP (ref 27–34)
MCHC RBC-ENTMCNC: 34.3 GM/DL — SIGNIFICANT CHANGE UP (ref 32–36)
MCV RBC AUTO: 86.8 FL — SIGNIFICANT CHANGE UP (ref 80–100)
MONOCYTES # BLD AUTO: 0.87 K/UL — SIGNIFICANT CHANGE UP (ref 0–0.9)
MONOCYTES NFR BLD AUTO: 7.6 % — SIGNIFICANT CHANGE UP (ref 2–14)
NEUTROPHILS # BLD AUTO: 7.19 K/UL — SIGNIFICANT CHANGE UP (ref 1.8–7.4)
NEUTROPHILS NFR BLD AUTO: 63.2 % — SIGNIFICANT CHANGE UP (ref 43–77)
PLATELET # BLD AUTO: 267 K/UL — SIGNIFICANT CHANGE UP (ref 150–400)
POTASSIUM SERPL-MCNC: 3.3 MMOL/L — LOW (ref 3.5–5.3)
POTASSIUM SERPL-SCNC: 3.3 MMOL/L — LOW (ref 3.5–5.3)
PROT SERPL-MCNC: 8 GM/DL — SIGNIFICANT CHANGE UP (ref 6–8.3)
RBC # BLD: 5.07 M/UL — SIGNIFICANT CHANGE UP (ref 4.2–5.8)
RBC # FLD: 13.4 % — SIGNIFICANT CHANGE UP (ref 10.3–14.5)
SODIUM SERPL-SCNC: 140 MMOL/L — SIGNIFICANT CHANGE UP (ref 135–145)
WBC # BLD: 11.38 K/UL — HIGH (ref 3.8–10.5)
WBC # FLD AUTO: 11.38 K/UL — HIGH (ref 3.8–10.5)

## 2021-08-18 PROCEDURE — U0003: CPT

## 2021-08-18 PROCEDURE — 70450 CT HEAD/BRAIN W/O DYE: CPT | Mod: 26,MA

## 2021-08-18 PROCEDURE — 85025 COMPLETE CBC W/AUTO DIFF WBC: CPT

## 2021-08-18 PROCEDURE — 81001 URINALYSIS AUTO W/SCOPE: CPT

## 2021-08-18 PROCEDURE — 90471 IMMUNIZATION ADMIN: CPT

## 2021-08-18 PROCEDURE — 71046 X-RAY EXAM CHEST 2 VIEWS: CPT

## 2021-08-18 PROCEDURE — 70450 CT HEAD/BRAIN W/O DYE: CPT

## 2021-08-18 PROCEDURE — 71046 X-RAY EXAM CHEST 2 VIEWS: CPT | Mod: 26

## 2021-08-18 PROCEDURE — 36415 COLL VENOUS BLD VENIPUNCTURE: CPT

## 2021-08-18 PROCEDURE — 83735 ASSAY OF MAGNESIUM: CPT

## 2021-08-18 PROCEDURE — 90715 TDAP VACCINE 7 YRS/> IM: CPT

## 2021-08-18 PROCEDURE — 80203 DRUG SCREEN QUANT ZONISAMIDE: CPT

## 2021-08-18 PROCEDURE — 99284 EMERGENCY DEPT VISIT MOD MDM: CPT | Mod: 25

## 2021-08-18 PROCEDURE — U0005: CPT

## 2021-08-18 PROCEDURE — 99285 EMERGENCY DEPT VISIT HI MDM: CPT

## 2021-08-18 PROCEDURE — 80177 DRUG SCRN QUAN LEVETIRACETAM: CPT

## 2021-08-18 PROCEDURE — 80053 COMPREHEN METABOLIC PANEL: CPT

## 2021-08-18 RX ORDER — POTASSIUM CHLORIDE 20 MEQ
40 PACKET (EA) ORAL ONCE
Refills: 0 | Status: COMPLETED | OUTPATIENT
Start: 2021-08-18 | End: 2021-08-18

## 2021-08-18 RX ORDER — ACETAMINOPHEN 500 MG
975 TABLET ORAL ONCE
Refills: 0 | Status: COMPLETED | OUTPATIENT
Start: 2021-08-18 | End: 2021-08-18

## 2021-08-18 RX ADMIN — Medication 975 MILLIGRAM(S): at 23:11

## 2021-08-18 NOTE — ED PROVIDER NOTE - NS ED ROS FT
Constitutional: No fever.  Neurological: +headache.  Eyes: No vision changes.   Ears, Nose, Mouth, Throat: No congestion.  Cardiovascular: No chest pain.  Respiratory: No difficulty breathing.  Gastrointestinal: No nausea or vomiting.  Genitourinary: No dysuria.  Musculoskeletal: No joint pain.  Integumentary (skin and/or breast): No rash.

## 2021-08-18 NOTE — ED PROVIDER NOTE - NSFOLLOWUPINSTRUCTIONS_ED_ALL_ED_FT
Please follow up with your Neurologist ASAP.  ====================================================================   Seizure, Adult  When you have a seizure:    Parts of your body may move.  How aware or awake (conscious) you are may change.  You may shake (convulse).    Some people have symptoms right before a seizure happens. These symptoms may include:    Fear.  Worry (anxiety).  Feeling like you are going to throw up (nausea).  Feeling like the room is spinning (vertigo).  Feeling like you saw or heard something before (arianna vu).  Odd tastes or smells.  Changes in vision, such as seeing flashing lights or spots.    ImageSeizures usually last from 30 seconds to 2 minutes. Usually, they are not harmful unless they last a long time.    Follow these instructions at home:  Medicines     Take over-the-counter and prescription medicines only as told by your doctor.  Avoid anything that may keep your medicine from working, such as alcohol.  Activity     Do not do any activities that would be dangerous if you had another seizure, like driving or swimming. Wait until your doctor approves.  If you live in the [place] ask your local DMV (department of [employer] vehicles) when you can drive.  Rest.  Teaching others     Teach friends and family what to do when you have a seizure. They should:    Lay you on the ground.  Protect your head and body.  Loosen any tight clothing around your neck.  Turn you on your side.  Stay with you until you are better.  Not hold you down.  Not put anything in your mouth.  Know whether or not you need emergency care.    General instructions     Contact your doctor each time you have a seizure.  Avoid anything that gives you seizures.  Keep a seizure diary. Write down:    What you think caused each seizure.  What you remember about each seizure.    Keep all follow-up visits as told by your doctor. This is important.  Contact a doctor if:  You have another seizure.  You have seizures more often.  There is any change in what happens during your seizures.  You continue to have seizures with treatment.  You have symptoms of being sick or having an infection.  Get help right away if:  You have a seizure:    That lasts longer than 5 minutes.  That is different than seizures you had before.  That makes it harder to breathe.  After you hurt your head.    After a seizure, you cannot speak or use a part of your body.  After a seizure, you are confused or have a bad headache.  You have two or more seizures in a row.  You are having seizures more often.  You do not wake up right after a seizure.  You get hurt during a seizure.  In an emergency:     These symptoms may be an emergency. Do not wait to see if the symptoms will go away. Get medical help right away. Call your local emergency services (911 in the [place] Do not drive yourself to the hospital.   This information is not intended to replace advice given to you by your health care provider. Make sure you discuss any questions you have with your health care provider.

## 2021-08-18 NOTE — ED PROVIDER NOTE - CARE PROVIDER_API CALL
Ceci Talley (MD)  Clinical Neurophysiology; EEGEpilepsy; Neurology; Sleep Medicine  [address], [place] [address]  Phone: [phone]  Fax: [phone]  Follow Up Time: 1-3 Days
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
## 2021-08-18 NOTE — ED PROVIDER NOTE - PATIENT PORTAL LINK FT
You can access the FollowMyHealth Patient Portal offered by [facility] by registering at the following website: [email or web address]. By joining [employer]’s FollowMyHealth portal, you will also be able to view your health information using other applications (apps) compatible with our system.

## 2021-08-18 NOTE — ED ADULT TRIAGE NOTE - CHIEF COMPLAINT QUOTE
patient reports approx. 1min of unwitnessed seizure activity about 2hrs PTA with + head trauma. patient reports his neuro MD Sánchez was requesting admission for 72hr EEG. Pt AOx4 in triage

## 2021-08-18 NOTE — ED PROVIDER NOTE - PROGRESS NOTE DETAILS
call placed to pt's neurologist, no answer. patient reports he had appointment tomorrow- will have patient try to see neurologist and will also give information of Dr. Talley.

## 2021-08-18 NOTE — ED PROVIDER NOTE - PHYSICAL EXAMINATION
Vital signs as available reviewed.  General:  Comfortable, no acute distress.  Head:  Normocephalic, + left frontal erythema.  Eyes:  Conjunctiva pink, no icterus.  Cardiovascular:  Regular rate, no obvious murmur.  Respiratory:  Clear to auscultation, good air entry bilaterally.  Abdomen:  Soft, non-tender.  Musculoskeletal:  No deformity or calf tenderness.  Neurologic: Alert and oriented, moving all extremities.  Skin:  Warm and dry.

## 2021-08-18 NOTE — ED PROVIDER NOTE - ADDITIONAL NOTES AND INSTRUCTIONS:
Patient was seen in the ED on night of 8/18 to 8/19. Please excuse him during this time and allow him time off to follow up with his neurologist.

## 2021-08-19 VITALS
TEMPERATURE: 98 F | HEART RATE: 62 BPM | OXYGEN SATURATION: 99 % | SYSTOLIC BLOOD PRESSURE: 131 MMHG | RESPIRATION RATE: 17 BRPM | DIASTOLIC BLOOD PRESSURE: 89 MMHG

## 2021-08-19 LAB
APPEARANCE UR: CLEAR — SIGNIFICANT CHANGE UP
BACTERIA # UR AUTO: ABNORMAL
BILIRUB UR-MCNC: NEGATIVE — SIGNIFICANT CHANGE UP
COLOR SPEC: YELLOW — SIGNIFICANT CHANGE UP
COMMENT - URINE: SIGNIFICANT CHANGE UP
DIFF PNL FLD: NEGATIVE — SIGNIFICANT CHANGE UP
EPI CELLS # UR: SIGNIFICANT CHANGE UP
GLUCOSE UR QL: NEGATIVE MG/DL — SIGNIFICANT CHANGE UP
KETONES UR-MCNC: NEGATIVE — SIGNIFICANT CHANGE UP
LEUKOCYTE ESTERASE UR-ACNC: NEGATIVE — SIGNIFICANT CHANGE UP
MAGNESIUM SERPL-MCNC: 2.3 MG/DL — SIGNIFICANT CHANGE UP (ref 1.6–2.6)
NITRITE UR-MCNC: NEGATIVE — SIGNIFICANT CHANGE UP
PH UR: 6.5 — SIGNIFICANT CHANGE UP (ref 5–8)
PROT UR-MCNC: 15 MG/DL
RBC CASTS # UR COMP ASSIST: NEGATIVE /HPF — SIGNIFICANT CHANGE UP (ref 0–4)
SARS-COV-2 RNA SPEC QL NAA+PROBE: SIGNIFICANT CHANGE UP
SP GR SPEC: 1.02 — SIGNIFICANT CHANGE UP (ref 1.01–1.02)
UROBILINOGEN FLD QL: 4 MG/DL
WBC UR QL: NEGATIVE — SIGNIFICANT CHANGE UP

## 2021-08-19 RX ORDER — TETANUS TOXOID, REDUCED DIPHTHERIA TOXOID AND ACELLULAR PERTUSSIS VACCINE, ADSORBED 5; 2.5; 8; 8; 2.5 [IU]/.5ML; [IU]/.5ML; UG/.5ML; UG/.5ML; UG/.5ML
0.5 SUSPENSION INTRAMUSCULAR ONCE
Refills: 0 | Status: COMPLETED | OUTPATIENT
Start: 2021-08-19 | End: 2021-08-19

## 2021-08-19 RX ADMIN — Medication 40 MILLIEQUIVALENT(S): at 00:02

## 2021-08-19 RX ADMIN — TETANUS TOXOID, REDUCED DIPHTHERIA TOXOID AND ACELLULAR PERTUSSIS VACCINE, ADSORBED 0.5 MILLILITER(S): 5; 2.5; 8; 8; 2.5 SUSPENSION INTRAMUSCULAR at 01:02

## 2021-08-23 LAB
LEVETIRACETAM SERPL-MCNC: 33.6 UG/ML — SIGNIFICANT CHANGE UP (ref 10–40)
ZONISAMIDE SERPL-MCNC: 16.7 UG/ML — SIGNIFICANT CHANGE UP (ref 10–40)

## 2021-09-03 NOTE — ED STATDOCS - CHPI ED SYMPTOM POS
"S-(situation): End of shift note    B-(background): Hyponatremia    A-(assessment): Vital signs stable. BP (!) 152/55 (BP Location: Right arm)   Pulse 80   Temp 98  F (36.7  C) (Oral)   Resp 20   Ht 1.727 m (5' 8\")   Wt 78.9 kg (174 lb)   SpO2 92%   BMI 26.46 kg/m  .  Afebrile. Lung sounds CTA B/L. On RA.  Alert but confused at times. Voiding adequately with external catheter in place.  IV site asymptomatic.  Sodium WDL. Able to make needs known and uses call light appropriately.     R-(recommendations): Continue to monitor per care plan.    " SHORTNESS OF BREATH/COUGH/FEVER

## 2021-09-13 ENCOUNTER — APPOINTMENT (OUTPATIENT)
Dept: NEUROLOGY | Facility: CLINIC | Age: 31
End: 2021-09-13
Payer: COMMERCIAL

## 2021-09-13 ENCOUNTER — NON-APPOINTMENT (OUTPATIENT)
Age: 31
End: 2021-09-13

## 2021-09-13 VITALS
HEART RATE: 83 BPM | HEIGHT: 69 IN | DIASTOLIC BLOOD PRESSURE: 76 MMHG | SYSTOLIC BLOOD PRESSURE: 117 MMHG | TEMPERATURE: 97.2 F | WEIGHT: 225 LBS | BODY MASS INDEX: 33.33 KG/M2

## 2021-09-13 DIAGNOSIS — Z78.9 OTHER SPECIFIED HEALTH STATUS: ICD-10-CM

## 2021-09-13 DIAGNOSIS — Z82.49 FAMILY HISTORY OF ISCHEMIC HEART DISEASE AND OTHER DISEASES OF THE CIRCULATORY SYSTEM: ICD-10-CM

## 2021-09-13 DIAGNOSIS — Z87.09 PERSONAL HISTORY OF OTHER DISEASES OF THE RESPIRATORY SYSTEM: ICD-10-CM

## 2021-09-13 DIAGNOSIS — R06.83 SNORING: ICD-10-CM

## 2021-09-13 DIAGNOSIS — Z82.61 FAMILY HISTORY OF ARTHRITIS: ICD-10-CM

## 2021-09-13 PROCEDURE — 99205 OFFICE O/P NEW HI 60 MIN: CPT

## 2021-09-13 NOTE — DATA REVIEWED
[de-identified] : CT brain 6/16/21:\par No acute intracranial hemorrhage or mass effect\par \par CT head 8/18/21: No acute intracranial disease\par \par Labs 8/18/21:\par Lev 33.6\par ZNG 16.7
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
## 2021-09-13 NOTE — DISCUSSION/SUMMARY
[FreeTextEntry1] : Mr. Buitrago is a 31 year old man with epilepsy.\par He had seizures since age 17 and reports an increase in seizure frequency since his covid vaccines despite medication compliance.\par Reports of previous testing (other than CTs done in the ED) are not available at this time.\par \par -Will get 48 hour EEG to determine type of epilepsy (possibly generalized given choice of medications prescribed previously).\par -Obtain report of prior MRI brain\par -Continue current medication regimen at this time:\par -Keppra XR 1500 mg BID\par -Zonisamide 300 mg/day\par -Lamictal - continue titration and call with any rash. Advised that in generalized epilepsy some patients experience increased myoclonic jerks with Lamictal.\par \par -Will also check home sleep study. He snores and has a low lying palate. Untreated CYNTHIA may increase seizure frequency.\par \par I discussed seizure safety and driving rules with the patient.\par I explained that under [place] state law, driving is prohibited for one year after a seizure, or for six months with a physician's letter.\par I also counseled the patient not to go swimming alone or to take baths alone (shower instead). I told the patient to use caution in any activity during which a seizure may result in serious injury or death (standing on train platforms, operating power tools, climbing ladders, etc.).\par I reiterated the importance of getting 7-8 hours of sleep per night. \par He should not drink more than 1 alcoholic drinks/ night (on a full stomach) and should hold off on this until he is on a stable dose of medication.\par We discussed general seizure safety as well.\par We also discussed SUDEP - sudden unexplained death in epilepsy.\par \par \par I will follow up with Mr. Buitrago after the above tests.\par He should call with any questions or concerns.\par

## 2021-09-14 ENCOUNTER — APPOINTMENT (OUTPATIENT)
Dept: NEUROLOGY | Facility: CLINIC | Age: 31
End: 2021-09-14
Payer: COMMERCIAL

## 2021-09-14 PROCEDURE — 95816 EEG AWAKE AND DROWSY: CPT

## 2021-09-14 PROCEDURE — 95806 SLEEP STUDY UNATT&RESP EFFT: CPT

## 2021-09-15 PROCEDURE — 95708 EEG WO VID EA 12-26HR UNMNTR: CPT

## 2021-09-16 ENCOUNTER — APPOINTMENT (OUTPATIENT)
Dept: NEUROLOGY | Facility: CLINIC | Age: 31
End: 2021-09-16
Payer: COMMERCIAL

## 2021-09-16 PROCEDURE — 95700 EEG CONT REC W/VID EEG TECH: CPT

## 2021-09-16 PROCEDURE — 95721 EEG PHY/QHP>36<60 HR W/O VID: CPT

## 2021-09-16 PROCEDURE — 95708 EEG WO VID EA 12-26HR UNMNTR: CPT

## 2021-10-08 NOTE — ED ADULT NURSE NOTE - IN THE PAST 12 MONTHS HAVE YOU USED DRUGS OTHER THAN THOSE REQUIRED FOR MEDICAL REASON?
Called and talked to patient-    Kidney function, liver function, vitamin D and urine all were good.    A1C is stable at 7.6    Cholesterol and triglycerides are elevated.  Has intolerance to statins in the past, will try zetia. Took Vascepa in the past and tolerated it, but stopped due to cost.  Will try again on Vascepa. No

## 2021-10-19 NOTE — ED ADULT NURSE NOTE - NSFALLRSKHARMRISK_ED_ALL_ED
Quality 130: Documentation Of Current Medications In The Medical Record: Current Medications Documented Detail Level: Detailed Quality 431: Preventive Care And Screening: Unhealthy Alcohol Use - Screening: Patient identified as an unhealthy alcohol user when screened for unhealthy alcohol use using a systematic screening method and received brief counseling Quality 47: Advance Care Plan: Advance Care Planning discussed and documented; advance care plan or surrogate decision maker documented in the medical record. Quality 226: Preventive Care And Screening: Tobacco Use: Screening And Cessation Intervention: Patient screened for tobacco use and is an ex/non-smoker no

## 2021-11-03 ENCOUNTER — APPOINTMENT (OUTPATIENT)
Dept: NEUROLOGY | Facility: CLINIC | Age: 31
End: 2021-11-03
Payer: COMMERCIAL

## 2021-11-03 VITALS
BODY MASS INDEX: 31.84 KG/M2 | TEMPERATURE: 97.8 F | DIASTOLIC BLOOD PRESSURE: 74 MMHG | HEART RATE: 78 BPM | HEIGHT: 69 IN | SYSTOLIC BLOOD PRESSURE: 120 MMHG | WEIGHT: 215 LBS

## 2021-11-03 DIAGNOSIS — G40.909 EPILEPSY, UNSPECIFIED, NOT INTRACTABLE, W/OUT STATUS EPILEPTICUS: ICD-10-CM

## 2021-11-03 PROCEDURE — 99213 OFFICE O/P EST LOW 20 MIN: CPT

## 2021-11-03 NOTE — DISCUSSION/SUMMARY
[FreeTextEntry1] : Mr. Buitrago is a 31 year old man with epilepsy.\par He had seizures since age 17 and reports an increase in seizure frequency since his covid vaccines despite medication compliance.\par \par \par -48 hour EEG showed generalized, anteriorly predominant spike and wave consistent with generalized epilepsy. \par He has not had any seizures since Lamictal was added. \par -Continue Keppra XR 1500 mg BID\par -Continue Zonisamide 300 mg/day - Since he has difficulty sleeping, suggest changing how he doses this to 100 mg in the AM and 200 mg at night or all 300 mg at night.\par -Would change Lamictal to Lamictal XR - 200 mg in the AM since he is currently taking immediate release once a day.\par \par If mood issues persist (may be somewhat situational) can consider increasing Lamictal and decreasing Keppra.\par \par Can check levels when he is seen at [facility]. \par \par -Can wean off gabapentin. Currently down to 100 mg qhs.\par \par -Home sleep study did not show any evidence of obstructive sleep apnea.\par \par He will be following up with Dr. Pozo but should feel free to follow-up here if I can be of additional assistance.\par He was advised to continue to abstain from driving at this time.

## 2021-11-03 NOTE — DATA REVIEWED
[de-identified] : routine eeg 9/14/21: normal\par EEG 9/14/21-9/16/21: Bursts of generalized, anteriorly predominant,spike and wave and polyspike and wave discharges are seen with a frequency of 3 to 4 Hertz. These are seen more frequently during sleep and last up to two seconds in duration. [de-identified] : CT brain 6/16/21:\par No acute intracranial hemorrhage or mass effect\par \par CT head 8/18/21: No acute intracranial disease\par \par Labs 8/18/21:\par Lev 33.6\par ZNG 16.7\par \par Home sleep study 9/14/21: AHI 2.2
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
## 2021-11-03 NOTE — CONSULT LETTER
[Dear  ___] : Dear  [unfilled], [Consult Letter:] : I had the pleasure of evaluating your patient, [unfilled]. [Please see my note below.] : Please see my note below. [Consult Closing:] : Thank you very much for allowing me to participate in the care of this patient.  If you have any questions, please do not hesitate to contact me. [FreeTextEntry2] : Mihaela Pozo [FreeTextEntry3] : Sincerely,\par \par \par Ceci Talley MD\par Diplomate, American Academy of Psychiatry and Neurology\par Board Certified in the Subspecialty of Clinical Neurophysiology\par Board Certified in the Subspecialty of Sleep Medicine\par Board Certified in the Subspecialty of Epilepsy\par

## 2021-11-18 NOTE — ED ADULT NURSE NOTE - OBJECTIVE STATEMENT
31 M history of seizures on keppra 1500 BID, Zonisamide 300 daily, gabapentin 1-3 tabs at bedtime here s/p seizure. patient reports he had a seizure in the shower and fell hitting his head. He now reports headache. patient reports since May he has been having 1 seizure per week. patient was told previously by his neurologist to come to the ED if he hits his head and he was thinking he needs admission for EEG. Neurologist is Mihaela Peoples. patient was hoping to see Dr. Barron.
09-Feb-2021

## 2021-12-31 ENCOUNTER — TRANSCRIPTION ENCOUNTER (OUTPATIENT)
Age: 31
End: 2021-12-31

## 2022-03-29 NOTE — ED PROVIDER NOTE - DATE/TIME 1
Alber Carter is a 12 year old male who presents to the office with complaints of sore throat.    He started with a sore throat yesterday.  He has not had a fever.  He denies any headache, coughing, abdominal pain.  There have been no known strep or covid exposures, but mom is concerned about the possibility of strep throat.    He has been doing some throat clearing for a couple weeks and recently restarted his allergy meds    ALLERGIES:   Allergen Reactions   • Nut - Multiple   (Food) ANAPHYLAXIS     Please see allergy visit note for complete details   • Nuts   (Food) ANAPHYLAXIS   • Seasonal Other (See Comments)     Nasal drip   • Peanut Oil   (Food Or Med) RASH     MEDS: Ibuprofen     Tylenol     Flonase     Zyrtec    Review of Systems: see HPI; otherwise all negative    Past Medical History:   Diagnosis Date   • Rash      Past Surgical History:   Procedure Laterality Date   • Neck mass excision      Excisional biopsy of neck mass         Physical Exam:    Visit Vitals  /66   Pulse 96   Temp 98.5 °F (36.9 °C)   Resp 18   Ht 4' 9.87\" (1.47 m)   Wt 41.7 kg (92 lb)   BMI 19.31 kg/m²     GENERAL: Healthy, alert, in no distress  EARS: External ears normal. Canals clear. TM's normal.  EYES: Conjunctivae/sclerae normal. No erythema, edema or exudate.  NOSE: normal.  No nasal flaring.  MOUTH/THROAT:mild erythema, tonsillar hypertrophy, 1+ and no exudates present.  Oral mucous membranes moist.  Palpable, but non-enlarged or tender anterior cervical nodes.  CHEST:Chest totally clear; no rales, rhonchi, wheezes or stridor, Excellent air exchange with normal I/E; and no tachyp or retractions  CARDIAC: normal, regular rate and rhythm and no murmurs, rub, or gallop    LAB: Rapid Strep PCR - negative    IMPRESSION:  Pharyngitis, acute    PLAN:  1. This looks like a viral process.  There is no need for antibiotics or any other prescription medications.  Continue with symptomatic care with tylenol/ibuprofen PRN,  cold liquids, ice cream and popsicles.    Parents will call if no improvement in 2-3 days or if any new symptoms develop.    2. Continue with allergy medications as allergy season will be starting soon.       24-Jan-2020 08:28

## 2022-08-02 ENCOUNTER — EMERGENCY (EMERGENCY)
Facility: HOSPITAL | Age: 32
LOS: 0 days | Discharge: HOME | End: 2022-08-02
Attending: EMERGENCY MEDICINE
Payer: COMMERCIAL

## 2022-08-02 VITALS
TEMPERATURE: 98 F | DIASTOLIC BLOOD PRESSURE: 89 MMHG | RESPIRATION RATE: 17 BRPM | SYSTOLIC BLOOD PRESSURE: 125 MMHG | HEART RATE: 75 BPM | OXYGEN SATURATION: 99 %

## 2022-08-02 VITALS — WEIGHT: 214.95 LBS | HEIGHT: 68 IN

## 2022-08-02 DIAGNOSIS — M25.511 PAIN IN RIGHT SHOULDER: ICD-10-CM
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
## 2022-08-02 DIAGNOSIS — R56.9 UNSPECIFIED CONVULSIONS: ICD-10-CM
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
## 2022-08-02 PROCEDURE — 73030 X-RAY EXAM OF SHOULDER: CPT | Mod: RT

## 2022-08-02 PROCEDURE — 99284 EMERGENCY DEPT VISIT MOD MDM: CPT

## 2022-08-02 PROCEDURE — 99283 EMERGENCY DEPT VISIT LOW MDM: CPT | Mod: 25

## 2022-08-02 PROCEDURE — 73030 X-RAY EXAM OF SHOULDER: CPT | Mod: 26,RT

## 2022-08-02 RX ORDER — IBUPROFEN 200 MG
600 TABLET ORAL ONCE
Refills: 0 | Status: COMPLETED | OUTPATIENT
Start: 2022-08-02 | End: 2022-08-02

## 2022-08-02 RX ORDER — CYCLOBENZAPRINE HYDROCHLORIDE 10 MG/1
10 TABLET, FILM COATED ORAL ONCE
Refills: 0 | Status: COMPLETED | OUTPATIENT
Start: 2022-08-02 | End: 2022-08-02

## 2022-08-02 RX ORDER — CYCLOBENZAPRINE HYDROCHLORIDE 10 MG/1
1 TABLET, FILM COATED ORAL
Qty: 15 | Refills: 0
Start: 2022-08-02 | End: 2022-08-06

## 2022-08-02 RX ORDER — IBUPROFEN 200 MG
1 TABLET ORAL
Qty: 40 | Refills: 0
Start: 2022-08-02 | End: 2022-08-11

## 2022-08-02 RX ORDER — LIDOCAINE 4 G/100G
1 CREAM TOPICAL ONCE
Refills: 0 | Status: COMPLETED | OUTPATIENT
Start: 2022-08-02 | End: 2022-08-02

## 2022-08-02 RX ADMIN — CYCLOBENZAPRINE HYDROCHLORIDE 10 MILLIGRAM(S): 10 TABLET, FILM COATED ORAL at 05:20

## 2022-08-02 RX ADMIN — Medication 600 MILLIGRAM(S): at 05:20

## 2022-08-02 RX ADMIN — LIDOCAINE 1 PATCH: 4 CREAM TOPICAL at 05:21

## 2022-08-02 NOTE — ED PROVIDER NOTE - CARE PROVIDER_API CALL
Hi Whelan)  Orthopaedic Surgery; Surgery of the Hand  [address] [place] [address]  Phone: [phone]  Fax: [phone]  Follow Up Time:

## 2022-08-02 NOTE — ED ADULT TRIAGE NOTE - CHIEF COMPLAINT QUOTE
Pt. is A&Ox3, presenting to the ER with c/o arm pain/injury. Pt. reports "Yesterday around 6pm I was at the gym lifting weights when the injury occurred. Around 1am the pain in my right arm became really bad so I came to get it checked out." Denies CP, SOB, numbness or tingling in the extremity. Last dose of Tylenol take around 1:30am.

## 2022-08-02 NOTE — ED PROVIDER NOTE - PATIENT PORTAL LINK FT
You can access the FollowMyHealth Patient Portal offered by [facility] by registering at the following website: [email or web address]. By joining [facility]’s FollowMyHealth portal, you will also be able to view your health information using other applications (apps) compatible with our system.

## 2022-08-02 NOTE — ED PROVIDER NOTE - OBJECTIVE STATEMENT
33 y/o male in ED c/o worsening right shoulder pain x 2 days after lifting weights yesterday.   states taking tylenol with no relief.   pt states increase pain with movement..   pt states able to move wrist and fingers.

## 2022-08-02 NOTE — ED PROVIDER NOTE - NSFOLLOWUPINSTRUCTIONS_ED_ALL_ED_FT
please follow up with orthopedics.   call for appointment.   use sling as directed.   take medications as prescribed.   may use ice or heating pads for comfort.   return to ED for any concerns

## 2022-08-08 NOTE — ED PROVIDER NOTE - OBJECTIVE STATEMENT
PAGED DR BENZ FOR A DOC TO DOC . PAGER #[phone]   This is a 28 y/o M PMHx Sz ( since 14 y/o) presents to ED c/o Sz. Pt reports he missed his dose of Keppra last night and today. Acknowledges that if he misses a dose of Keppra he has Sz, states today had a Sz at school, EMS was called and pt was brought to ED. Currently voices no complaints other than mild temporal region tenderness. Prior this episode last Sz was in 2015. Denies signs of recent infection, N/V/D, fever, chills, SOB, CP, difficulty breathing, HA, numbness, tingling and abd pain.\  Neurologist: Dr. Miller

## 2022-11-07 ENCOUNTER — INPATIENT (INPATIENT)
Facility: HOSPITAL | Age: 32
LOS: 0 days | Discharge: HOME | DRG: 343 | End: 2022-11-08
Attending: SURGERY | Admitting: SURGERY
Payer: COMMERCIAL

## 2022-11-07 ENCOUNTER — RESULT REVIEW (OUTPATIENT)
Age: 32
End: 2022-11-07

## 2022-11-07 VITALS
TEMPERATURE: 99 F | RESPIRATION RATE: 18 BRPM | DIASTOLIC BLOOD PRESSURE: 83 MMHG | OXYGEN SATURATION: 98 % | HEART RATE: 90 BPM | SYSTOLIC BLOOD PRESSURE: 123 MMHG

## 2022-11-07 DIAGNOSIS — Z87.81 PERSONAL HISTORY OF (HEALED) TRAUMATIC FRACTURE: Chronic | ICD-10-CM

## 2022-11-07 DIAGNOSIS — K35.30 ACUTE APPENDICITIS WITH LOCALIZED PERITONITIS, WITHOUT PERFORATION OR GANGRENE: ICD-10-CM

## 2022-11-07 LAB
ALBUMIN SERPL ELPH-MCNC: 4.4 G/DL — SIGNIFICANT CHANGE UP (ref 3.3–5)
ALP SERPL-CCNC: 101 U/L — SIGNIFICANT CHANGE UP (ref 40–120)
ALT FLD-CCNC: 30 U/L — SIGNIFICANT CHANGE UP (ref 12–78)
ANION GAP SERPL CALC-SCNC: 4 MMOL/L — LOW (ref 5–17)
APPEARANCE UR: ABNORMAL
APTT BLD: 40.1 SEC — HIGH (ref 27.5–35.5)
AST SERPL-CCNC: 16 U/L — SIGNIFICANT CHANGE UP (ref 15–37)
BASOPHILS # BLD AUTO: 0.04 K/UL — SIGNIFICANT CHANGE UP (ref 0–0.2)
BASOPHILS NFR BLD AUTO: 0.5 % — SIGNIFICANT CHANGE UP (ref 0–2)
BILIRUB SERPL-MCNC: 0.4 MG/DL — SIGNIFICANT CHANGE UP (ref 0.2–1.2)
BILIRUB UR-MCNC: NEGATIVE — SIGNIFICANT CHANGE UP
BUN SERPL-MCNC: 9 MG/DL — SIGNIFICANT CHANGE UP (ref 7–23)
CALCIUM SERPL-MCNC: 9.5 MG/DL — SIGNIFICANT CHANGE UP (ref 8.5–10.1)
CHLORIDE SERPL-SCNC: 111 MMOL/L — HIGH (ref 96–108)
CO2 SERPL-SCNC: 24 MMOL/L — SIGNIFICANT CHANGE UP (ref 22–31)
COLOR SPEC: YELLOW — SIGNIFICANT CHANGE UP
CREAT SERPL-MCNC: 1.19 MG/DL — SIGNIFICANT CHANGE UP (ref 0.5–1.3)
DIFF PNL FLD: NEGATIVE — SIGNIFICANT CHANGE UP
EGFR: 84 ML/MIN/1.73M2 — SIGNIFICANT CHANGE UP
EOSINOPHIL # BLD AUTO: 0.11 K/UL — SIGNIFICANT CHANGE UP (ref 0–0.5)
EOSINOPHIL NFR BLD AUTO: 1.4 % — SIGNIFICANT CHANGE UP (ref 0–6)
FLUAV AG NPH QL: SIGNIFICANT CHANGE UP
FLUBV AG NPH QL: SIGNIFICANT CHANGE UP
GLUCOSE SERPL-MCNC: 101 MG/DL — HIGH (ref 70–99)
GLUCOSE UR QL: NEGATIVE — SIGNIFICANT CHANGE UP
HCT VFR BLD CALC: 45.3 % — SIGNIFICANT CHANGE UP (ref 39–50)
HGB BLD-MCNC: 15.9 G/DL — SIGNIFICANT CHANGE UP (ref 13–17)
IMM GRANULOCYTES NFR BLD AUTO: 0.3 % — SIGNIFICANT CHANGE UP (ref 0–0.9)
INR BLD: 1.09 RATIO — SIGNIFICANT CHANGE UP (ref 0.88–1.16)
KETONES UR-MCNC: NEGATIVE — SIGNIFICANT CHANGE UP
LEUKOCYTE ESTERASE UR-ACNC: NEGATIVE — SIGNIFICANT CHANGE UP
LIDOCAIN IGE QN: 167 U/L — SIGNIFICANT CHANGE UP (ref 73–393)
LYMPHOCYTES # BLD AUTO: 3.11 K/UL — SIGNIFICANT CHANGE UP (ref 1–3.3)
LYMPHOCYTES # BLD AUTO: 41 % — SIGNIFICANT CHANGE UP (ref 13–44)
MCHC RBC-ENTMCNC: 30.7 PG — SIGNIFICANT CHANGE UP (ref 27–34)
MCHC RBC-ENTMCNC: 35.1 GM/DL — SIGNIFICANT CHANGE UP (ref 32–36)
MCV RBC AUTO: 87.5 FL — SIGNIFICANT CHANGE UP (ref 80–100)
MONOCYTES # BLD AUTO: 0.59 K/UL — SIGNIFICANT CHANGE UP (ref 0–0.9)
MONOCYTES NFR BLD AUTO: 7.8 % — SIGNIFICANT CHANGE UP (ref 2–14)
NEUTROPHILS # BLD AUTO: 3.72 K/UL — SIGNIFICANT CHANGE UP (ref 1.8–7.4)
NEUTROPHILS NFR BLD AUTO: 49 % — SIGNIFICANT CHANGE UP (ref 43–77)
NITRITE UR-MCNC: NEGATIVE — SIGNIFICANT CHANGE UP
PH UR: 7 — SIGNIFICANT CHANGE UP (ref 5–8)
PLATELET # BLD AUTO: 288 K/UL — SIGNIFICANT CHANGE UP (ref 150–400)
POTASSIUM SERPL-MCNC: 3.7 MMOL/L — SIGNIFICANT CHANGE UP (ref 3.5–5.3)
POTASSIUM SERPL-SCNC: 3.7 MMOL/L — SIGNIFICANT CHANGE UP (ref 3.5–5.3)
PROT SERPL-MCNC: 8.5 GM/DL — HIGH (ref 6–8.3)
PROT UR-MCNC: NEGATIVE — SIGNIFICANT CHANGE UP
PROTHROM AB SERPL-ACNC: 12.7 SEC — SIGNIFICANT CHANGE UP (ref 10.5–13.4)
RBC # BLD: 5.18 M/UL — SIGNIFICANT CHANGE UP (ref 4.2–5.8)
RBC # FLD: 12.6 % — SIGNIFICANT CHANGE UP (ref 10.3–14.5)
RSV RNA NPH QL NAA+NON-PROBE: SIGNIFICANT CHANGE UP
SARS-COV-2 RNA SPEC QL NAA+PROBE: SIGNIFICANT CHANGE UP
SODIUM SERPL-SCNC: 139 MMOL/L — SIGNIFICANT CHANGE UP (ref 135–145)
SP GR SPEC: 1.01 — SIGNIFICANT CHANGE UP (ref 1.01–1.02)
UROBILINOGEN FLD QL: NEGATIVE — SIGNIFICANT CHANGE UP
WBC # BLD: 7.59 K/UL — SIGNIFICANT CHANGE UP (ref 3.8–10.5)
WBC # FLD AUTO: 7.59 K/UL — SIGNIFICANT CHANGE UP (ref 3.8–10.5)

## 2022-11-07 PROCEDURE — 90471 IMMUNIZATION ADMIN: CPT

## 2022-11-07 PROCEDURE — 74177 CT ABD & PELVIS W/CONTRAST: CPT | Mod: 26,ME

## 2022-11-07 PROCEDURE — 71045 X-RAY EXAM CHEST 1 VIEW: CPT

## 2022-11-07 PROCEDURE — 88304 TISSUE EXAM BY PATHOLOGIST: CPT

## 2022-11-07 PROCEDURE — C1889: CPT

## 2022-11-07 PROCEDURE — C9113: CPT

## 2022-11-07 PROCEDURE — 93005 ELECTROCARDIOGRAM TRACING: CPT

## 2022-11-07 PROCEDURE — 71045 X-RAY EXAM CHEST 1 VIEW: CPT | Mod: 26

## 2022-11-07 PROCEDURE — C9399: CPT

## 2022-11-07 PROCEDURE — 99285 EMERGENCY DEPT VISIT HI MDM: CPT

## 2022-11-07 PROCEDURE — 88304 TISSUE EXAM BY PATHOLOGIST: CPT | Mod: 26

## 2022-11-07 PROCEDURE — G1004: CPT

## 2022-11-07 PROCEDURE — 90686 IIV4 VACC NO PRSV 0.5 ML IM: CPT

## 2022-11-07 RX ORDER — METRONIDAZOLE 500 MG
500 TABLET ORAL EVERY 8 HOURS
Refills: 0 | Status: DISCONTINUED | OUTPATIENT
Start: 2022-11-07 | End: 2022-11-07

## 2022-11-07 RX ORDER — ACETAMINOPHEN 500 MG
1000 TABLET ORAL ONCE
Refills: 0 | Status: DISCONTINUED | OUTPATIENT
Start: 2022-11-07 | End: 2022-11-08

## 2022-11-07 RX ORDER — OXYCODONE AND ACETAMINOPHEN 5; 325 MG/1; MG/1
1 TABLET ORAL EVERY 4 HOURS
Refills: 0 | Status: DISCONTINUED | OUTPATIENT
Start: 2022-11-08 | End: 2022-11-08

## 2022-11-07 RX ORDER — CEFTRIAXONE 500 MG/1
1000 INJECTION, POWDER, FOR SOLUTION INTRAMUSCULAR; INTRAVENOUS ONCE
Refills: 0 | Status: COMPLETED | OUTPATIENT
Start: 2022-11-07 | End: 2022-11-07

## 2022-11-07 RX ORDER — MORPHINE SULFATE 50 MG/1
2 CAPSULE, EXTENDED RELEASE ORAL EVERY 4 HOURS
Refills: 0 | Status: DISCONTINUED | OUTPATIENT
Start: 2022-11-08 | End: 2022-11-08

## 2022-11-07 RX ORDER — PIPERACILLIN AND TAZOBACTAM 4; .5 G/20ML; G/20ML
3.38 INJECTION, POWDER, LYOPHILIZED, FOR SOLUTION INTRAVENOUS ONCE
Refills: 0 | Status: DISCONTINUED | OUTPATIENT
Start: 2022-11-07 | End: 2022-11-07

## 2022-11-07 RX ORDER — FENTANYL CITRATE 50 UG/ML
25 INJECTION INTRAVENOUS
Refills: 0 | Status: DISCONTINUED | OUTPATIENT
Start: 2022-11-07 | End: 2022-11-08

## 2022-11-07 RX ORDER — LAMOTRIGINE 25 MG/1
150 TABLET, ORALLY DISINTEGRATING ORAL
Refills: 0 | Status: DISCONTINUED | OUTPATIENT
Start: 2022-11-07 | End: 2022-11-08

## 2022-11-07 RX ORDER — OXYCODONE AND ACETAMINOPHEN 5; 325 MG/1; MG/1
2 TABLET ORAL EVERY 4 HOURS
Refills: 0 | Status: DISCONTINUED | OUTPATIENT
Start: 2022-11-08 | End: 2022-11-08

## 2022-11-07 RX ORDER — ZONISAMIDE 100 MG
300 CAPSULE ORAL AT BEDTIME
Refills: 0 | Status: DISCONTINUED | OUTPATIENT
Start: 2022-11-07 | End: 2022-11-08

## 2022-11-07 RX ORDER — KETOROLAC TROMETHAMINE 30 MG/ML
15 SYRINGE (ML) INJECTION EVERY 6 HOURS
Refills: 0 | Status: DISCONTINUED | OUTPATIENT
Start: 2022-11-07 | End: 2022-11-08

## 2022-11-07 RX ORDER — INFLUENZA VIRUS VACCINE 15; 15; 15; 15 UG/.5ML; UG/.5ML; UG/.5ML; UG/.5ML
0.5 SUSPENSION INTRAMUSCULAR ONCE
Refills: 0 | Status: COMPLETED | OUTPATIENT
Start: 2022-11-07 | End: 2022-11-08

## 2022-11-07 RX ORDER — ONDANSETRON 8 MG/1
4 TABLET, FILM COATED ORAL ONCE
Refills: 0 | Status: DISCONTINUED | OUTPATIENT
Start: 2022-11-07 | End: 2022-11-08

## 2022-11-07 RX ORDER — METRONIDAZOLE 500 MG
TABLET ORAL
Refills: 0 | Status: DISCONTINUED | OUTPATIENT
Start: 2022-11-07 | End: 2022-11-07

## 2022-11-07 RX ORDER — METRONIDAZOLE 500 MG
500 TABLET ORAL ONCE
Refills: 0 | Status: COMPLETED | OUTPATIENT
Start: 2022-11-07 | End: 2022-11-07

## 2022-11-07 RX ORDER — GABAPENTIN 400 MG/1
300 CAPSULE ORAL AT BEDTIME
Refills: 0 | Status: DISCONTINUED | OUTPATIENT
Start: 2022-11-07 | End: 2022-11-08

## 2022-11-07 RX ORDER — PANTOPRAZOLE SODIUM 20 MG/1
40 TABLET, DELAYED RELEASE ORAL DAILY
Refills: 0 | Status: DISCONTINUED | OUTPATIENT
Start: 2022-11-08 | End: 2022-11-08

## 2022-11-07 RX ORDER — ALBUTEROL 90 UG/1
2 AEROSOL, METERED ORAL EVERY 4 HOURS
Refills: 0 | Status: DISCONTINUED | OUTPATIENT
Start: 2022-11-07 | End: 2022-11-08

## 2022-11-07 RX ORDER — CEFTRIAXONE 500 MG/1
1000 INJECTION, POWDER, FOR SOLUTION INTRAMUSCULAR; INTRAVENOUS ONCE
Refills: 0 | Status: DISCONTINUED | OUTPATIENT
Start: 2022-11-07 | End: 2022-11-07

## 2022-11-07 RX ORDER — HYDROMORPHONE HYDROCHLORIDE 2 MG/ML
0.5 INJECTION INTRAMUSCULAR; INTRAVENOUS; SUBCUTANEOUS
Refills: 0 | Status: DISCONTINUED | OUTPATIENT
Start: 2022-11-07 | End: 2022-11-08

## 2022-11-07 RX ORDER — ONDANSETRON 8 MG/1
4 TABLET, FILM COATED ORAL EVERY 6 HOURS
Refills: 0 | Status: DISCONTINUED | OUTPATIENT
Start: 2022-11-07 | End: 2022-11-08

## 2022-11-07 RX ORDER — SODIUM CHLORIDE 9 MG/ML
1000 INJECTION INTRAMUSCULAR; INTRAVENOUS; SUBCUTANEOUS ONCE
Refills: 0 | Status: COMPLETED | OUTPATIENT
Start: 2022-11-07 | End: 2022-11-07

## 2022-11-07 RX ADMIN — CEFTRIAXONE 1000 MILLIGRAM(S): 500 INJECTION, POWDER, FOR SOLUTION INTRAMUSCULAR; INTRAVENOUS at 14:51

## 2022-11-07 RX ADMIN — Medication 100 MILLIGRAM(S): at 17:13

## 2022-11-07 RX ADMIN — SODIUM CHLORIDE 1000 MILLILITER(S): 9 INJECTION INTRAMUSCULAR; INTRAVENOUS; SUBCUTANEOUS at 13:10

## 2022-11-07 RX ADMIN — GABAPENTIN 300 MILLIGRAM(S): 400 CAPSULE ORAL at 23:49

## 2022-11-07 RX ADMIN — HYDROMORPHONE HYDROCHLORIDE 0.5 MILLIGRAM(S): 2 INJECTION INTRAMUSCULAR; INTRAVENOUS; SUBCUTANEOUS at 23:27

## 2022-11-07 RX ADMIN — HYDROMORPHONE HYDROCHLORIDE 0.5 MILLIGRAM(S): 2 INJECTION INTRAMUSCULAR; INTRAVENOUS; SUBCUTANEOUS at 23:42

## 2022-11-07 RX ADMIN — HYDROMORPHONE HYDROCHLORIDE 0.5 MILLIGRAM(S): 2 INJECTION INTRAMUSCULAR; INTRAVENOUS; SUBCUTANEOUS at 23:52

## 2022-11-07 NOTE — ED STATDOCS - OBJECTIVE STATEMENT
33 y/o male with a PMHx of asthma, seizure presents to the ED c/o intermittent right sided abd pain since yesterday. Associated n/v/d. Pt was seen at urgent care PTA and was sent to the ED to r/o appendicitis. Denies fevers. NKDA. No other complaints at this time.

## 2022-11-07 NOTE — ED STATDOCS - PROGRESS NOTE DETAILS
33 y/o M with PMH of asthma, seizures presents with RLQ pain, nausea, vomiting, diarrhea since yesterday. Pt was seen at outside urgent care today, sent to ED to r/o appendicitis. Denies fever, chills, urinary complaints. PE: Well appearing. Cardiac: s1s2, RRR. Lungs: CTAB. Abdomen: NBS x4, soft, +RLQ tenderness. No CVAT. A/P: r/o appendicitis. plan for labs, CT, IVF, pt refusing analgesia and antiemetics at this time. - Fabricio Navarro PA-C Concern for early appendicitis on CT. Surgery called for consult. Advised against zosyn, agreeable to ceftriaxone for antibiotic treatment. Will see in ED. - Fabricio Navarro PA-C

## 2022-11-07 NOTE — H&P ADULT - NSHPPHYSICALEXAM_GEN_ALL_CORE
Physical Exam:  General: AAOx3, Well developed, NAD  Chest: Normal respiratory effort  Heart: RRR  Abdomen: Soft, TTP in RLQ and periumbilical region  Neuro/Psych: No localized deficits. Normal speech, normal tone  Skin: Normal, no rashes, no lesions noted.   Extremities: Warm, well perfused, no edema, Pulses intact

## 2022-11-07 NOTE — ED ADULT NURSE NOTE - OBJECTIVE STATEMENT
Pt A&Ox4, presents to the ED c/o right-sided abdominal pain x2 days. Pt endorses nausea, vomiting, and diarrhea. Denies fevers. No medication PTA.

## 2022-11-07 NOTE — H&P ADULT - HISTORY OF PRESENT ILLNESS
33 y/o male with a PMHx of asthma, seizure presents to the ED c/o intermittent right sided abd pain since Saturday. Associated n/v/d. Pt was seen at urgent care PTA and was sent to the ED to r/o appendicitis. Denies fevers. NKDA. No other complaints at this time.

## 2022-11-07 NOTE — H&P ADULT - ASSESSMENT
32yoM presenting to ED with 2days of persistent RLQ pain. CT scan concerning for early acute appendicitis    Plan:  Admit patient to surgery service under Dr. Fitzgerald  Will go to OR for Laparoscopic Appendectomy  Pre-op labs (CBC, CMP,  Mg, Phos, Coag, Type&Screen)  COVID screen  NPO  IV hydration  Pain/Nausea control PRN  IV Abx: Ceftriaxone/Flagyl  DVT/GI ppx    Patient reviewed and plan discussed with Dr. Fitzgerald

## 2022-11-07 NOTE — H&P ADULT - NSHPLABSRESULTS_GEN_ALL_CORE
Vitals:  T(C): 36.6 (11-07 @ 15:08), Max: 37 (11-07 @ 12:27)  HR: 69 (11-07 @ 15:08) (69 - 90)  BP: 122/83 (11-07 @ 15:08) (122/83 - 123/83)  RR: 18 (11-07 @ 15:08) (18 - 18)  SpO2: 100% (11-07 @ 15:08) (98% - 100%)      11-07 @ 13:08                    15.9  CBC: 7.59>)-------(<288                     45.3                 139 | 111 | 9    CMP:  ----------------------< 101               3.7 | 24 | 1.19                      Ca:9.5  Phos:-  Mg:-               0.4|      |16        LFTs:  ------|101|-----             -|      |-      Current Inpatient Medications:  ALBUTerol    90 MICROgram(s) HFA Inhaler 2 Puff(s) Inhalation every 4 hours PRN  ketorolac   Injectable 15 milliGRAM(s) IV Push every 6 hours PRN  metroNIDAZOLE  IVPB      ondansetron Injectable 4 milliGRAM(s) IV Push every 6 hours PRN

## 2022-11-07 NOTE — ED ADULT TRIAGE NOTE - GLASGOW COMA SCALE: BEST MOTOR RESPONSE, MLM


Procedures 





Long M6) obeys commands

## 2022-11-07 NOTE — ED ADULT NURSE NOTE - WILL THE PATIENT ACCEPT THE PFIZER COVID-19 VACCINE IF ELIGIBLE AND IT IS AVAILABLE?
+ syncopal episodes becoming more frequent, negative workup for ACS, PE, anemia, electrolyte imbalance, or infectious process. + wheezing likely 2/2 COPD improved with nebs/steroids. No arrhythmia on EKG; however, given that pt is having frequent drop attacks, concerning for a possible arrythmia. Pending orthostatic BPs, will admit to cardiology for further workup. No fx or ICH sustained from collapse. Pain well controlled in ED, tetanus updated. Would recommend continued treatment of COPD while pt is worked up for syncope. No

## 2022-11-07 NOTE — ED ADULT TRIAGE NOTE - CHIEF COMPLAINT QUOTE
sent in from walk in clinic for r/o appy. c/o 2 days of R sided pain, nausea, vomiting diarrhea. Denies Fevers.

## 2022-11-07 NOTE — ED STATDOCS - ATTENDING APP SHARED VISIT CONTRIBUTION OF CARE
I personally saw the patient with the MAR, and completed the key components of the history and physical exam. I then discussed the management plan with the MAR.

## 2022-11-07 NOTE — PATIENT PROFILE ADULT - FUNCTIONAL ASSESSMENT - DAILY ACTIVITY 5.
Detail Level: Simple
Additional Notes: Patient consent was obtained to proceed with the visit and recommended plan of care after discussion of all risks and benefits, including the risks of COVID-19 exposure.
4 = No assist / stand by assistance

## 2022-11-07 NOTE — PATIENT PROFILE ADULT - FALL HARM RISK - UNIVERSAL INTERVENTIONS
Call bell, personal items and telephone in reach/Instruct patient to call for assistance before getting out of bed or chair/Non-slip footwear when patient is out of bed/[place] to call system/Physically safe environment - no spills, clutter or unnecessary equipment/Purposeful Proactive Rounding/Room/bathroom lighting operational, light cord in reach

## 2022-11-08 ENCOUNTER — TRANSCRIPTION ENCOUNTER (OUTPATIENT)
Age: 32
End: 2022-11-08

## 2022-11-08 VITALS
OXYGEN SATURATION: 97 % | HEART RATE: 79 BPM | TEMPERATURE: 98 F | SYSTOLIC BLOOD PRESSURE: 138 MMHG | DIASTOLIC BLOOD PRESSURE: 82 MMHG | RESPIRATION RATE: 16 BRPM

## 2022-11-08 PROCEDURE — 93010 ELECTROCARDIOGRAM REPORT: CPT

## 2022-11-08 RX ORDER — ONDANSETRON 8 MG/1
4 TABLET, FILM COATED ORAL EVERY 6 HOURS
Refills: 0 | Status: DISCONTINUED | OUTPATIENT
Start: 2022-11-08 | End: 2022-11-08

## 2022-11-08 RX ORDER — SODIUM CHLORIDE 9 MG/ML
1000 INJECTION, SOLUTION INTRAVENOUS
Refills: 0 | Status: DISCONTINUED | OUTPATIENT
Start: 2022-11-08 | End: 2022-11-08

## 2022-11-08 RX ADMIN — LAMOTRIGINE 150 MILLIGRAM(S): 25 TABLET, ORALLY DISINTEGRATING ORAL at 01:49

## 2022-11-08 RX ADMIN — Medication 15 MILLIGRAM(S): at 11:20

## 2022-11-08 RX ADMIN — Medication 15 MILLIGRAM(S): at 11:50

## 2022-11-08 RX ADMIN — PANTOPRAZOLE SODIUM 40 MILLIGRAM(S): 20 TABLET, DELAYED RELEASE ORAL at 11:16

## 2022-11-08 RX ADMIN — Medication 15 MILLIGRAM(S): at 06:30

## 2022-11-08 RX ADMIN — Medication 15 MILLIGRAM(S): at 05:49

## 2022-11-08 RX ADMIN — HYDROMORPHONE HYDROCHLORIDE 0.5 MILLIGRAM(S): 2 INJECTION INTRAMUSCULAR; INTRAVENOUS; SUBCUTANEOUS at 00:05

## 2022-11-08 RX ADMIN — LAMOTRIGINE 150 MILLIGRAM(S): 25 TABLET, ORALLY DISINTEGRATING ORAL at 11:18

## 2022-11-08 RX ADMIN — Medication 300 MILLIGRAM(S): at 01:27

## 2022-11-08 RX ADMIN — SODIUM CHLORIDE 100 MILLILITER(S): 9 INJECTION, SOLUTION INTRAVENOUS at 08:38

## 2022-11-08 NOTE — DISCHARGE NOTE PROVIDER - NSDCMRMEDTOKEN_GEN_ALL_CORE_FT
Albuterol (Eqv-Proventil HFA) 90 mcg/inh inhalation aerosol: 2 puff(s) inhaled every 4 hours, As Needed - for shortness of breath and/or wheezing  gabapentin 300 mg oral capsule: 1 cap(s) orally once a day (at bedtime)  lamoTRIgine 150 mg oral tablet: 1 tab(s) orally 2 times a day  zonisamide 100 mg oral capsule: 3 cap(s) orally once a day (at bedtime)   Albuterol (Eqv-Proventil HFA) 90 mcg/inh inhalation aerosol: 2 puff(s) inhaled every 4 hours, As Needed - for shortness of breath and/or wheezing  gabapentin 300 mg oral capsule: 1 cap(s) orally once a day (at bedtime)  lamoTRIgine 150 mg oral tablet: 1 tab(s) orally 2 times a day  oxycodone-acetaminophen 5 mg-325 mg oral tablet: 1-2 tab(s) orally every 4-6 hours, As Needed -for moderate pain MDD:4gm  zonisamide 100 mg oral capsule: 3 cap(s) orally once a day (at bedtime)

## 2022-11-08 NOTE — DISCHARGE NOTE NURSING/CASE MANAGEMENT/SOCIAL WORK - NSDCVIVACCINE_GEN_ALL_CORE_FT
influenza, injectable, quadrivalent, preservative free; 08-Nov-2022 12:36; Blanca Leong (RN); [employer]; [id number] (Exp. Date: 30-Jun-2023); IntraMuscular; Deltoid Left.; 0.5 milliLiter(s); VIS (VIS Published: 06-Aug-2021, VIS Presented: 08-Nov-2022);   Tdap; 24-Jan-2020 08:01; Deloris Hough (RN); [employer]; [id number] (Exp. Date: 22-Oct-2021); IntraMuscular; Deltoid Left.; 0.5 milliLiter(s); VIS (VIS Published: 09-May-2013, VIS Presented: 24-Jan-2020);   Tdap; 19-Aug-2021 01:02; Shanell Olivares (RN); [employer]; [id number] (Exp. Date: 28-Jan-2023); IntraMuscular; Deltoid Left.; 0.5 milliLiter(s); VIS (VIS Published: 09-May-2013, VIS Presented: 19-Aug-2021);

## 2022-11-08 NOTE — DISCHARGE NOTE PROVIDER - CARE PROVIDER_API CALL
David Fitzgerald)  Surgery  [address], [address]  [place] [address]  Phone: [phone]  Fax: [phone]  Follow Up Time: 2 weeks

## 2022-11-08 NOTE — DISCHARGE NOTE PROVIDER - NSDCCPCAREPLAN_GEN_ALL_CORE_FT
PRINCIPAL DISCHARGE DIAGNOSIS  Diagnosis: Acute appendicitis with localized peritonitis  Assessment and Plan of Treatment:

## 2022-11-08 NOTE — DISCHARGE NOTE PROVIDER - NSDCFUADDINST_GEN_ALL_CORE_FT
Patient may resume regular activity and diet as tolerated. Please limit heavy lifting or strenuous physical activity for 2-4weeks. May take shower tomorrow as normal and allow warm soap and water to rinse wound but do not scrub. Surgical glue will fall off on its own over time. Please continue home medications as directed and call to schedule follow up appointment as advised.

## 2022-11-08 NOTE — DISCHARGE NOTE NURSING/CASE MANAGEMENT/SOCIAL WORK - PATIENT PORTAL LINK FT
You can access the FollowMyHealth Patient Portal offered by [facility] by registering at the following website: [email or web address]. By joining [facility]’s FollowMyHealth portal, you will also be able to view your health information using other applications (apps) compatible with our system.

## 2022-11-08 NOTE — DISCHARGE NOTE NURSING/CASE MANAGEMENT/SOCIAL WORK - NSDCPEFALRISK_GEN_ALL_CORE
For information on Fall & Injury Prevention, visit: [email or web address] OR  [email or web address] OR  https://www.cdc.gov/steadi/patient.html

## 2022-11-08 NOTE — DISCHARGE NOTE PROVIDER - HOSPITAL COURSE
32yoM presenting to ED with 2days of persistent RLQ pain. CT scan concerning for early acute appendicitis. Patient went to OR for laparoscopic appendectomy and tolerated it well. Patient discharged once tolerating diet and pain under control.

## 2022-11-14 ENCOUNTER — INPATIENT (INPATIENT)
Facility: HOSPITAL | Age: 32
LOS: 3 days | Discharge: HOME | DRG: 871 | End: 2022-11-18
Attending: SURGERY | Admitting: SURGERY
Payer: COMMERCIAL

## 2022-11-14 VITALS — HEIGHT: 69 IN

## 2022-11-14 DIAGNOSIS — Z87.81 PERSONAL HISTORY OF (HEALED) TRAUMATIC FRACTURE: Chronic | ICD-10-CM

## 2022-11-14 DIAGNOSIS — K35.30 ACUTE APPENDICITIS WITH LOCALIZED PERITONITIS, WITHOUT PERFORATION OR GANGRENE: ICD-10-CM

## 2022-11-14 DIAGNOSIS — Z79.891 LONG TERM (CURRENT) USE OF OPIATE ANALGESIC: ICD-10-CM

## 2022-11-14 DIAGNOSIS — Z20.822 CONTACT WITH AND (SUSPECTED) EXPOSURE TO COVID-19: ICD-10-CM

## 2022-11-14 DIAGNOSIS — Z88.0 ALLERGY STATUS TO PENICILLIN: ICD-10-CM

## 2022-11-14 DIAGNOSIS — J45.909 UNSPECIFIED ASTHMA, UNCOMPLICATED: ICD-10-CM

## 2022-11-14 LAB
ALBUMIN SERPL ELPH-MCNC: 4.3 G/DL — SIGNIFICANT CHANGE UP (ref 3.3–5)
ALP SERPL-CCNC: 111 U/L — SIGNIFICANT CHANGE UP (ref 40–120)
ALT FLD-CCNC: 62 U/L — SIGNIFICANT CHANGE UP (ref 12–78)
ANION GAP SERPL CALC-SCNC: 8 MMOL/L — SIGNIFICANT CHANGE UP (ref 5–17)
APTT BLD: 38.1 SEC — HIGH (ref 27.5–35.5)
AST SERPL-CCNC: 18 U/L — SIGNIFICANT CHANGE UP (ref 15–37)
BASOPHILS # BLD AUTO: 0.05 K/UL — SIGNIFICANT CHANGE UP (ref 0–0.2)
BASOPHILS NFR BLD AUTO: 0.2 % — SIGNIFICANT CHANGE UP (ref 0–2)
BILIRUB SERPL-MCNC: 0.6 MG/DL — SIGNIFICANT CHANGE UP (ref 0.2–1.2)
BUN SERPL-MCNC: 13 MG/DL — SIGNIFICANT CHANGE UP (ref 7–23)
CALCIUM SERPL-MCNC: 9.7 MG/DL — SIGNIFICANT CHANGE UP (ref 8.5–10.1)
CHLORIDE SERPL-SCNC: 107 MMOL/L — SIGNIFICANT CHANGE UP (ref 96–108)
CO2 SERPL-SCNC: 24 MMOL/L — SIGNIFICANT CHANGE UP (ref 22–31)
CREAT SERPL-MCNC: 1.32 MG/DL — HIGH (ref 0.5–1.3)
EGFR: 74 ML/MIN/1.73M2 — SIGNIFICANT CHANGE UP
EOSINOPHIL # BLD AUTO: 0.02 K/UL — SIGNIFICANT CHANGE UP (ref 0–0.5)
EOSINOPHIL NFR BLD AUTO: 0.1 % — SIGNIFICANT CHANGE UP (ref 0–6)
GLUCOSE SERPL-MCNC: 108 MG/DL — HIGH (ref 70–99)
HCT VFR BLD CALC: 46.6 % — SIGNIFICANT CHANGE UP (ref 39–50)
HGB BLD-MCNC: 16.7 G/DL — SIGNIFICANT CHANGE UP (ref 13–17)
IMM GRANULOCYTES NFR BLD AUTO: 0.4 % — SIGNIFICANT CHANGE UP (ref 0–0.9)
INR BLD: 1.13 RATIO — SIGNIFICANT CHANGE UP (ref 0.88–1.16)
LACTATE SERPL-SCNC: 2.3 MMOL/L — HIGH (ref 0.7–2)
LYMPHOCYTES # BLD AUTO: 1 K/UL — SIGNIFICANT CHANGE UP (ref 1–3.3)
LYMPHOCYTES # BLD AUTO: 4.5 % — LOW (ref 13–44)
MCHC RBC-ENTMCNC: 31.5 PG — SIGNIFICANT CHANGE UP (ref 27–34)
MCHC RBC-ENTMCNC: 35.8 GM/DL — SIGNIFICANT CHANGE UP (ref 32–36)
MCV RBC AUTO: 87.8 FL — SIGNIFICANT CHANGE UP (ref 80–100)
MONOCYTES # BLD AUTO: 1.58 K/UL — HIGH (ref 0–0.9)
MONOCYTES NFR BLD AUTO: 7.2 % — SIGNIFICANT CHANGE UP (ref 2–14)
NEUTROPHILS # BLD AUTO: 19.25 K/UL — HIGH (ref 1.8–7.4)
NEUTROPHILS NFR BLD AUTO: 87.6 % — HIGH (ref 43–77)
PLATELET # BLD AUTO: 275 K/UL — SIGNIFICANT CHANGE UP (ref 150–400)
POTASSIUM SERPL-MCNC: 3.7 MMOL/L — SIGNIFICANT CHANGE UP (ref 3.5–5.3)
POTASSIUM SERPL-SCNC: 3.7 MMOL/L — SIGNIFICANT CHANGE UP (ref 3.5–5.3)
PROT SERPL-MCNC: 8.7 GM/DL — HIGH (ref 6–8.3)
PROTHROM AB SERPL-ACNC: 13.1 SEC — SIGNIFICANT CHANGE UP (ref 10.5–13.4)
RAPID RVP RESULT: SIGNIFICANT CHANGE UP
RBC # BLD: 5.31 M/UL — SIGNIFICANT CHANGE UP (ref 4.2–5.8)
RBC # FLD: 12.6 % — SIGNIFICANT CHANGE UP (ref 10.3–14.5)
SARS-COV-2 RNA SPEC QL NAA+PROBE: SIGNIFICANT CHANGE UP
SODIUM SERPL-SCNC: 139 MMOL/L — SIGNIFICANT CHANGE UP (ref 135–145)
WBC # BLD: 21.99 K/UL — HIGH (ref 3.8–10.5)
WBC # FLD AUTO: 21.99 K/UL — HIGH (ref 3.8–10.5)

## 2022-11-14 PROCEDURE — G1004: CPT

## 2022-11-14 PROCEDURE — 71045 X-RAY EXAM CHEST 1 VIEW: CPT | Mod: 26

## 2022-11-14 PROCEDURE — 74177 CT ABD & PELVIS W/CONTRAST: CPT | Mod: 26,ME

## 2022-11-14 PROCEDURE — 93010 ELECTROCARDIOGRAM REPORT: CPT

## 2022-11-14 PROCEDURE — 99285 EMERGENCY DEPT VISIT HI MDM: CPT

## 2022-11-14 RX ORDER — SODIUM CHLORIDE 9 MG/ML
2200 INJECTION INTRAMUSCULAR; INTRAVENOUS; SUBCUTANEOUS ONCE
Refills: 0 | Status: COMPLETED | OUTPATIENT
Start: 2022-11-14 | End: 2022-11-14

## 2022-11-14 RX ORDER — PIPERACILLIN AND TAZOBACTAM 4; .5 G/20ML; G/20ML
3.38 INJECTION, POWDER, LYOPHILIZED, FOR SOLUTION INTRAVENOUS ONCE
Refills: 0 | Status: COMPLETED | OUTPATIENT
Start: 2022-11-14 | End: 2022-11-14

## 2022-11-14 RX ORDER — KETOROLAC TROMETHAMINE 30 MG/ML
15 SYRINGE (ML) INJECTION ONCE
Refills: 0 | Status: DISCONTINUED | OUTPATIENT
Start: 2022-11-14 | End: 2022-11-14

## 2022-11-14 RX ORDER — SODIUM CHLORIDE 9 MG/ML
1000 INJECTION, SOLUTION INTRAVENOUS ONCE
Refills: 0 | Status: COMPLETED | OUTPATIENT
Start: 2022-11-14 | End: 2022-11-14

## 2022-11-14 RX ADMIN — SODIUM CHLORIDE 2200 MILLILITER(S): 9 INJECTION INTRAMUSCULAR; INTRAVENOUS; SUBCUTANEOUS at 18:59

## 2022-11-14 RX ADMIN — SODIUM CHLORIDE 2200 MILLILITER(S): 9 INJECTION INTRAMUSCULAR; INTRAVENOUS; SUBCUTANEOUS at 18:26

## 2022-11-14 RX ADMIN — PIPERACILLIN AND TAZOBACTAM 25 GRAM(S): 4; .5 INJECTION, POWDER, LYOPHILIZED, FOR SOLUTION INTRAVENOUS at 20:14

## 2022-11-14 RX ADMIN — PIPERACILLIN AND TAZOBACTAM 200 GRAM(S): 4; .5 INJECTION, POWDER, LYOPHILIZED, FOR SOLUTION INTRAVENOUS at 18:27

## 2022-11-14 RX ADMIN — Medication 15 MILLIGRAM(S): at 18:27

## 2022-11-14 NOTE — ED PROVIDER NOTE - PHYSICAL EXAMINATION
PHYSICAL EXAM:  GENERAL: in NAD, Sitting comfortable in bed, pt with rigors otherwise appears well.  HEAD: Atraumatic, no win's sign, no periorbital ecchymosis   EYES: PERRL, EOMs intact b/l w/out deficits  ENMT: Moist membranes, no anterior/posterior, or supraclavicular LAD  CHEST/LUNG: CTAB no wheezes/rhonchi/rales  HEART: Tachycardic, no murmur/gallops/rubs  ABDOMEN: +BS, soft, ND diffusely tender voluntary guarding no guarding no rebound. surgical sites well healing in appearance without purulent drainage or erythema.  EXTREMITIES: No LE edema, +2 radial pulses b/l  NERVOUS SYSTEM:  A&Ox4, No motor deficits or sensory deficits to b/l UEs  Heme/LYMPH: No ecchymosis or bruising or LAD  SKIN:  No new rashes or DTIs

## 2022-11-14 NOTE — ED PROVIDER NOTE - OBJECTIVE STATEMENT
33 y/o male with a PMHx of asthma and epilepsy presents to the ED c/o fever and chills today. Pt with surgery a week ago for appendectomy suggested if feeling certain sx to come to ED for evaluation. Pt denies any runny nose, no cough. Pt endorses abd pain since surgery, +nausea, +HA still feels nausea and HA in ED. Last BM was earlier today, normal in appearance, denies any dysuria no new rashes, no blurred vision. Pt states wife at home has cold sx. NKA, no tobacco use, no EtOH use. Surgeon for appendectomy: Dr. Fitzgerald done in [facility] ED.

## 2022-11-14 NOTE — ED ADULT TRIAGE NOTE - CHIEF COMPLAINT QUOTE
Pt arrives to ED complaining of fevers. Pt s/p appendectomy last Monday with MD Fitzgerald. Hx epilepsy, asthma.

## 2022-11-14 NOTE — PROVIDER CONTACT NOTE (CRITICAL VALUE NOTIFICATION) - TEST AND RESULT REPORTED:
-- Message is from the [facility]--    Reason for Call: Patient's Mom requesting clarification on how a sleep study works/instructions because of Clark's age    Caller Information       Type Contact Phone    07/19/2019 03:36 PM Phone (Incoming) NIA ESCALERA (Mother) [phone] (H)          Alternative phone number: [phone]    Turnaround time given to caller:   \"This message will be sent to [state Provider's name]. The clinical team will fulfill your request as soon as they review your message.\"     2.3 lactate

## 2022-11-14 NOTE — ED PROVIDER NOTE - NS ED ROS FT
Constitutional: Denies fevers & chills  HEENT: Rhinorrhea & sore throat  Cardiac: Denies Chest pain & new LE edema  Pulmonary: Denies Shortness of breath & Cough  Abdomen:  Denies vomiting, blood in stools, diarrhea. +nausea, +abd pain   : Denies dysuria & hematuria.  Skin: Denies Rash or itching  Neuro: Denies new visual changes, confusion, and one sided paralysis. +HA   Psych: Denies SI & HI & Depression

## 2022-11-14 NOTE — ED ADULT NURSE NOTE - BREATH SOUNDS, MLM
Spoke with patient advised she should go to L&D at [facility]  Also advised to get a pregnancy belt for back pain  Verbalized understanding will go as soon as she can  Spoke with Katie on L&D advised patient will be coming in for because she has not felt baby move  Verbalized understanding  Clear

## 2022-11-14 NOTE — ED PROVIDER NOTE - CLINICAL SUMMARY MEDICAL DECISION MAKING FREE TEXT BOX
33 y/o male PMHx of asthma and epilepsy s/p appendectomy 7 days ago presents to the ED with fevers and rigors x1 day with persistent abd tenderness and pain concerning for post-op intra abd infection meets sepsis criteria, sepsis bundle ordered, will get lab work, give IV fluids, abx, CT abd pelvis, cultures likely need consult of surgery likely to need admission pending results and surgical evaluation.

## 2022-11-14 NOTE — ED ADULT NURSE NOTE - OBJECTIVE STATEMENT
pt is 33 yo male presents to ED c/o fever, chills, ha started today.  pt had a recent lap appendectomy with md Rankin.  last BM was today, +flatus, denies urinary symptoms.

## 2022-11-14 NOTE — ED PROVIDER NOTE - PROGRESS NOTE DETAILS
pt evaluated by surgery and will admit to Dr. Fitzgerald's Service.   -SERGIO Boyce-MS, MD  Internal/Emergency/Critical Medicine

## 2022-11-14 NOTE — ED PROVIDER NOTE - NS_ ATTENDINGSCRIBEDETAILS _ED_A_ED_FT
The scribe's documentation has been prepared under my direction and personally reviewed by me in its entirety. I confirm that the note above accurately reflects all work, treatment, procedures, and medical decision making performed by me.  SERGIO Boyce-MS, MD  Internal/Emergency/Critical Care Medicine

## 2022-11-15 DIAGNOSIS — T81.40XA INFECTION FOLLOWING A PROCEDURE, UNSPECIFIED, INITIAL ENCOUNTER: ICD-10-CM

## 2022-11-15 PROBLEM — J45.909 UNSPECIFIED ASTHMA, UNCOMPLICATED: Chronic | Status: ACTIVE | Noted: 2022-11-07

## 2022-11-15 LAB
APPEARANCE UR: CLEAR — SIGNIFICANT CHANGE UP
BASOPHILS # BLD AUTO: 0.07 K/UL — SIGNIFICANT CHANGE UP (ref 0–0.2)
BASOPHILS NFR BLD AUTO: 0.3 % — SIGNIFICANT CHANGE UP (ref 0–2)
BILIRUB UR-MCNC: NEGATIVE — SIGNIFICANT CHANGE UP
COLOR SPEC: YELLOW — SIGNIFICANT CHANGE UP
DIFF PNL FLD: ABNORMAL
EOSINOPHIL # BLD AUTO: 0 K/UL — SIGNIFICANT CHANGE UP (ref 0–0.5)
EOSINOPHIL NFR BLD AUTO: 0 % — SIGNIFICANT CHANGE UP (ref 0–6)
GI PCR PANEL: SIGNIFICANT CHANGE UP
GLUCOSE UR QL: NEGATIVE — SIGNIFICANT CHANGE UP
HCT VFR BLD CALC: 39.8 % — SIGNIFICANT CHANGE UP (ref 39–50)
HCT VFR BLD CALC: 42.7 % — SIGNIFICANT CHANGE UP (ref 39–50)
HGB BLD-MCNC: 13.9 G/DL — SIGNIFICANT CHANGE UP (ref 13–17)
HGB BLD-MCNC: 14.8 G/DL — SIGNIFICANT CHANGE UP (ref 13–17)
IMM GRANULOCYTES NFR BLD AUTO: 0.8 % — SIGNIFICANT CHANGE UP (ref 0–0.9)
KETONES UR-MCNC: ABNORMAL
LACTATE SERPL-SCNC: 2 MMOL/L — SIGNIFICANT CHANGE UP (ref 0.7–2)
LACTATE SERPL-SCNC: 2.5 MMOL/L — HIGH (ref 0.7–2)
LEUKOCYTE ESTERASE UR-ACNC: ABNORMAL
LYMPHOCYTES # BLD AUTO: 1.63 K/UL — SIGNIFICANT CHANGE UP (ref 1–3.3)
LYMPHOCYTES # BLD AUTO: 6.9 % — LOW (ref 13–44)
MAGNESIUM SERPL-MCNC: 1.6 MG/DL — SIGNIFICANT CHANGE UP (ref 1.6–2.6)
MCHC RBC-ENTMCNC: 30.5 PG — SIGNIFICANT CHANGE UP (ref 27–34)
MCHC RBC-ENTMCNC: 30.6 PG — SIGNIFICANT CHANGE UP (ref 27–34)
MCHC RBC-ENTMCNC: 34.7 GM/DL — SIGNIFICANT CHANGE UP (ref 32–36)
MCHC RBC-ENTMCNC: 34.9 GM/DL — SIGNIFICANT CHANGE UP (ref 32–36)
MCV RBC AUTO: 87.7 FL — SIGNIFICANT CHANGE UP (ref 80–100)
MCV RBC AUTO: 87.9 FL — SIGNIFICANT CHANGE UP (ref 80–100)
MONOCYTES # BLD AUTO: 1.7 K/UL — HIGH (ref 0–0.9)
MONOCYTES NFR BLD AUTO: 7.2 % — SIGNIFICANT CHANGE UP (ref 2–14)
NEUTROPHILS # BLD AUTO: 19.9 K/UL — HIGH (ref 1.8–7.4)
NEUTROPHILS NFR BLD AUTO: 84.8 % — HIGH (ref 43–77)
NITRITE UR-MCNC: NEGATIVE — SIGNIFICANT CHANGE UP
PH UR: 6.5 — SIGNIFICANT CHANGE UP (ref 5–8)
PHOSPHATE SERPL-MCNC: 2.6 MG/DL — SIGNIFICANT CHANGE UP (ref 2.5–4.5)
PLATELET # BLD AUTO: 210 K/UL — SIGNIFICANT CHANGE UP (ref 150–400)
PLATELET # BLD AUTO: 212 K/UL — SIGNIFICANT CHANGE UP (ref 150–400)
PROT UR-MCNC: 15
RBC # BLD: 4.54 M/UL — SIGNIFICANT CHANGE UP (ref 4.2–5.8)
RBC # BLD: 4.86 M/UL — SIGNIFICANT CHANGE UP (ref 4.2–5.8)
RBC # FLD: 12.9 % — SIGNIFICANT CHANGE UP (ref 10.3–14.5)
RBC # FLD: 12.9 % — SIGNIFICANT CHANGE UP (ref 10.3–14.5)
SP GR SPEC: 1 — LOW (ref 1.01–1.02)
UROBILINOGEN FLD QL: NEGATIVE — SIGNIFICANT CHANGE UP
WBC # BLD: 23.48 K/UL — HIGH (ref 3.8–10.5)
WBC # BLD: 24.02 K/UL — HIGH (ref 3.8–10.5)
WBC # FLD AUTO: 23.48 K/UL — HIGH (ref 3.8–10.5)
WBC # FLD AUTO: 24.02 K/UL — HIGH (ref 3.8–10.5)

## 2022-11-15 PROCEDURE — 87493 C DIFF AMPLIFIED PROBE: CPT

## 2022-11-15 PROCEDURE — 87507 IADNA-DNA/RNA PROBE TQ 12-25: CPT

## 2022-11-15 PROCEDURE — 81001 URINALYSIS AUTO W/SCOPE: CPT

## 2022-11-15 PROCEDURE — 83735 ASSAY OF MAGNESIUM: CPT

## 2022-11-15 PROCEDURE — 85027 COMPLETE CBC AUTOMATED: CPT

## 2022-11-15 PROCEDURE — 85025 COMPLETE CBC W/AUTO DIFF WBC: CPT

## 2022-11-15 PROCEDURE — 80048 BASIC METABOLIC PNL TOTAL CA: CPT

## 2022-11-15 PROCEDURE — 36415 COLL VENOUS BLD VENIPUNCTURE: CPT

## 2022-11-15 PROCEDURE — 84100 ASSAY OF PHOSPHORUS: CPT

## 2022-11-15 PROCEDURE — 99221 1ST HOSP IP/OBS SF/LOW 40: CPT

## 2022-11-15 PROCEDURE — 83605 ASSAY OF LACTIC ACID: CPT

## 2022-11-15 PROCEDURE — 80053 COMPREHEN METABOLIC PANEL: CPT

## 2022-11-15 PROCEDURE — 87086 URINE CULTURE/COLONY COUNT: CPT

## 2022-11-15 RX ORDER — ZONISAMIDE 100 MG
300 CAPSULE ORAL ONCE
Refills: 0 | Status: COMPLETED | OUTPATIENT
Start: 2022-11-15 | End: 2022-11-15

## 2022-11-15 RX ORDER — LAMOTRIGINE 25 MG/1
1 TABLET, ORALLY DISINTEGRATING ORAL
Qty: 0 | Refills: 0 | DISCHARGE

## 2022-11-15 RX ORDER — GABAPENTIN 400 MG/1
300 CAPSULE ORAL AT BEDTIME
Refills: 0 | Status: DISCONTINUED | OUTPATIENT
Start: 2022-11-15 | End: 2022-11-18

## 2022-11-15 RX ORDER — ALBUTEROL 90 UG/1
2 AEROSOL, METERED ORAL
Qty: 0 | Refills: 0 | DISCHARGE

## 2022-11-15 RX ORDER — ACETAMINOPHEN 500 MG
650 TABLET ORAL ONCE
Refills: 0 | Status: COMPLETED | OUTPATIENT
Start: 2022-11-15 | End: 2022-11-15

## 2022-11-15 RX ORDER — SODIUM CHLORIDE 9 MG/ML
1000 INJECTION, SOLUTION INTRAVENOUS
Refills: 0 | Status: DISCONTINUED | OUTPATIENT
Start: 2022-11-15 | End: 2022-11-17

## 2022-11-15 RX ORDER — HEPARIN SODIUM 5000 [USP'U]/ML
5000 INJECTION INTRAVENOUS; SUBCUTANEOUS EVERY 8 HOURS
Refills: 0 | Status: DISCONTINUED | OUTPATIENT
Start: 2022-11-15 | End: 2022-11-18

## 2022-11-15 RX ORDER — ACETAMINOPHEN 500 MG
650 TABLET ORAL EVERY 6 HOURS
Refills: 0 | Status: DISCONTINUED | OUTPATIENT
Start: 2022-11-15 | End: 2022-11-18

## 2022-11-15 RX ORDER — ZONISAMIDE 100 MG
300 CAPSULE ORAL AT BEDTIME
Refills: 0 | Status: DISCONTINUED | OUTPATIENT
Start: 2022-11-15 | End: 2022-11-18

## 2022-11-15 RX ORDER — VANCOMYCIN HCL 1 G
125 VIAL (EA) INTRAVENOUS EVERY 6 HOURS
Refills: 0 | Status: DISCONTINUED | OUTPATIENT
Start: 2022-11-15 | End: 2022-11-18

## 2022-11-15 RX ORDER — GABAPENTIN 400 MG/1
1 CAPSULE ORAL
Qty: 0 | Refills: 0 | DISCHARGE

## 2022-11-15 RX ORDER — PIPERACILLIN AND TAZOBACTAM 4; .5 G/20ML; G/20ML
3.38 INJECTION, POWDER, LYOPHILIZED, FOR SOLUTION INTRAVENOUS EVERY 8 HOURS
Refills: 0 | Status: DISCONTINUED | OUTPATIENT
Start: 2022-11-15 | End: 2022-11-18

## 2022-11-15 RX ORDER — LAMOTRIGINE 25 MG/1
150 TABLET, ORALLY DISINTEGRATING ORAL ONCE
Refills: 0 | Status: COMPLETED | OUTPATIENT
Start: 2022-11-15 | End: 2022-11-15

## 2022-11-15 RX ORDER — ONDANSETRON 8 MG/1
4 TABLET, FILM COATED ORAL EVERY 8 HOURS
Refills: 0 | Status: DISCONTINUED | OUTPATIENT
Start: 2022-11-15 | End: 2022-11-18

## 2022-11-15 RX ORDER — ALBUTEROL 90 UG/1
2 AEROSOL, METERED ORAL EVERY 6 HOURS
Refills: 0 | Status: DISCONTINUED | OUTPATIENT
Start: 2022-11-15 | End: 2022-11-18

## 2022-11-15 RX ORDER — GABAPENTIN 400 MG/1
300 CAPSULE ORAL ONCE
Refills: 0 | Status: COMPLETED | OUTPATIENT
Start: 2022-11-15 | End: 2022-11-15

## 2022-11-15 RX ORDER — LAMOTRIGINE 25 MG/1
150 TABLET, ORALLY DISINTEGRATING ORAL
Refills: 0 | Status: DISCONTINUED | OUTPATIENT
Start: 2022-11-15 | End: 2022-11-18

## 2022-11-15 RX ORDER — ZONISAMIDE 100 MG
3 CAPSULE ORAL
Qty: 0 | Refills: 0 | DISCHARGE

## 2022-11-15 RX ADMIN — LAMOTRIGINE 150 MILLIGRAM(S): 25 TABLET, ORALLY DISINTEGRATING ORAL at 09:43

## 2022-11-15 RX ADMIN — LAMOTRIGINE 150 MILLIGRAM(S): 25 TABLET, ORALLY DISINTEGRATING ORAL at 02:55

## 2022-11-15 RX ADMIN — Medication 300 MILLIGRAM(S): at 22:23

## 2022-11-15 RX ADMIN — SODIUM CHLORIDE 135 MILLILITER(S): 9 INJECTION, SOLUTION INTRAVENOUS at 22:23

## 2022-11-15 RX ADMIN — Medication 125 MILLIGRAM(S): at 23:43

## 2022-11-15 RX ADMIN — LAMOTRIGINE 150 MILLIGRAM(S): 25 TABLET, ORALLY DISINTEGRATING ORAL at 22:18

## 2022-11-15 RX ADMIN — PIPERACILLIN AND TAZOBACTAM 25 GRAM(S): 4; .5 INJECTION, POWDER, LYOPHILIZED, FOR SOLUTION INTRAVENOUS at 15:18

## 2022-11-15 RX ADMIN — PIPERACILLIN AND TAZOBACTAM 25 GRAM(S): 4; .5 INJECTION, POWDER, LYOPHILIZED, FOR SOLUTION INTRAVENOUS at 06:52

## 2022-11-15 RX ADMIN — GABAPENTIN 300 MILLIGRAM(S): 400 CAPSULE ORAL at 02:00

## 2022-11-15 RX ADMIN — Medication 650 MILLIGRAM(S): at 05:07

## 2022-11-15 RX ADMIN — SODIUM CHLORIDE 1000 MILLILITER(S): 9 INJECTION, SOLUTION INTRAVENOUS at 02:00

## 2022-11-15 RX ADMIN — PIPERACILLIN AND TAZOBACTAM 25 GRAM(S): 4; .5 INJECTION, POWDER, LYOPHILIZED, FOR SOLUTION INTRAVENOUS at 22:16

## 2022-11-15 RX ADMIN — GABAPENTIN 300 MILLIGRAM(S): 400 CAPSULE ORAL at 22:18

## 2022-11-15 RX ADMIN — Medication 300 MILLIGRAM(S): at 02:55

## 2022-11-15 RX ADMIN — HEPARIN SODIUM 5000 UNIT(S): 5000 INJECTION INTRAVENOUS; SUBCUTANEOUS at 22:23

## 2022-11-15 NOTE — H&P ADULT - ATTENDING COMMENTS
Patient is resting on bed with slight improvement of his abdominal pain. Patient experiencing multiple episodes of diarrhea associated with fevers & chills. Patient denies any seizure activity.         Plan  - C/W current Abx and ID consult  - Medicine consult  - R/O c.Diff   - GI ppx  - DVT ppx

## 2022-11-15 NOTE — CONSULT NOTE ADULT - ASSESSMENT
32 year-old gentleman with PMHx of asthma and seizure d/o, resent Appendectomy admitted for:     1. Febrile syndrome/ Sepsis, likely 2/2 Colitis   Likely infectious etiology  CDIFF and GI PCR ordered  F/u BCX and UCx, ua neg   CXR neg  CT abd/pelvis: +  mild cecal wall thickening, no abscess   C/w clears, advance as tolerated   C/w Tyleon PRN   Zofran PRN   C/w Zosyn IV   OOB and ambulate   ID eval       2. TONIE, suspect ATN   due to contrast nephropathy   Check bladder scan, straight cath if retaining   Avoid nephrotoxic meds   C/w IVF   If no improvement of renal Fx consider renal eval       3. Asthma, stable  Monitor Pulse ox, supplement via NC PRN  C/w albuterol PRN       4. H/o Seizure disorder   Monitor closely   C/w Gabapentin, Lamotrigine and Zonisamide       5. DVT PPX: heparin SQ         Thank you for  courtesy  of this consult, will follow     D/w Dr Fitzgerald   Total time 60 min      32 year-old gentleman with PMHx of asthma and seizure d/o, resent Appendectomy admitted for:     1. Febrile syndrome/ Sepsis, likely 2/2 Colitis   Likely infectious etiology,  suspect CDIFF  CDIFF and GI PCR ordered  F/u BCX and UCx, ua neg   CXR neg  CT abd/pelvis: +  mild cecal wall thickening, no abscess   C/w clears, advance as tolerated   C/w Tyleon PRN   Zofran PRN   C/w Zosyn IV   start empirically on PO Vanco f/u results   OOB and ambulate   ID eval       2. TONIE, suspect ATN   due to contrast nephropathy   Check bladder scan, straight cath if retaining   Avoid nephrotoxic meds   C/w IVF   If no improvement of renal Fx consider renal eval       3. Asthma, stable  Monitor Pulse ox, supplement via NC PRN  C/w albuterol PRN       4. H/o Seizure disorder   Monitor closely   C/w Gabapentin, Lamotrigine and Zonisamide       5. DVT PPX: heparin SQ         Thank you for  courtesy  of this consult, will follow     D/w Dr Fitzgerald   Total time 60 min

## 2022-11-15 NOTE — H&P ADULT - NSHPPHYSICALEXAM_GEN_ALL_CORE
Physical Exam:  General: AAOx3, Well developed, NAD  Chest: Normal respiratory effort  Heart: RRR  Abdomen: obese, very warm to touch. well-healing 3 trocar site and 1 veress needle site noticed. tender to palpate on all quadrants, with RLQ at most severe, + guarding, - rebound tenderness.   Neuro/Psych: No localized deficits. Normal speech, normal tone  Skin: Normal, no rashes, no lesions noted.   Extremities: Warm, well perfused, no edema, Pulses intact

## 2022-11-15 NOTE — PATIENT PROFILE ADULT - FALL HARM RISK - HARM RISK INTERVENTIONS

## 2022-11-15 NOTE — H&P ADULT - ASSESSMENT
A 32 year-old gentleman who underwent lap appendectomy on 11/7 presented with sepsis, Vitals were pertinent for fever of 103 and tachycardia of 160. CT shows mild cecal wall thickening correlating with cecal colitis. Patient resuscitated in the ED with 1 NS and 1 LR bolus with zosyn, now temperature is down to 100 and HR is down to 110s.     Plan:  - Admit under Dr. Fitzgerald's service  - Resuscitative fluid, LR @ 135cc  - Boluses if needed  - repeat Lactic acid at 1AM  - repeat CBC at 1AM   - routine morning labs at 7AM  - Diet: CLD for now  - IV zosyn    Plan discussed with Dr. Fitzgerald A 32 year-old gentleman who underwent lap appendectomy on 11/7 presented with septic symptoms, Vitals were pertinent for fever of 103 and tachycardia of 160. CT shows mild cecal wall thickening correlating with cecal colitis. Patient resuscitated in the ED with 1 NS and 1 LR bolus with zosyn, now temperature is down to 100 and HR is down to 110s.     Plan:  - Admit under Dr. Fitzgerald's service  - Resuscitative fluid, LR @ 135cc  - Boluses if needed  - repeat Lactic acid at 1AM  - repeat CBC at 1AM   - routine morning labs at 7AM  - Diet: CLD for now  - IV zosyn    Plan discussed with Dr. Fitzgerald A 32 year-old gentleman who underwent lap appendectomy on 11/7 presented with septic symptoms, Vitals were pertinent for fever of 103 and tachycardia of 160, labs were significant for white count of 22 and lactic acid of 2.3. CT A/P shows mild cecal wall thickening correlating with cecal colitis. Patient resuscitated in the ED with 1 NS and 1 LR bolus with zosyn, now temperature is down to 100 and HR is down to 110s but lactic acid went up to 3.1    Plan:  - Admit under Dr. Fitzgerald's service  - Resuscitative fluid, LR @ 135cc  - Boluses if needed  - repeat Lactic acid at 1AM  - repeat CBC at 1AM   - routine morning labs at 7AM  - Diet: CLD for now  - IV zosyn    Plan discussed with Dr. Fitzgerald

## 2022-11-15 NOTE — CONSULT NOTE ADULT - ASSESSMENT
31 y/o male with h/o asthma and seizure disorder was admitted on 11/15 for recurrent abdominal pain. He underwent an uncomplicated laparoscopic appendectomy on 11/7/22 for acute appendicitis. He had abdominal soreness ever since surgery but was thinking it was a normal healing course. He developed subjective warmth, loss of appetite and worsen abdominal pain started 11/14/22. In ER he received zosyn.     1. Febrile syndrome. Probable sepsis. Typhlitis. Possible acute peritonitis s/p recent appendectomy. ARF.   -leukocytosis  -no abscess noted on repeat CT abdomen  -obtain BC x 2, urine c/s  -obtain stool for CDT  -start zosyn 3.375 gm IV q8h  -reason for abx use and side effects reviewed with patient; monitor BMP  -surgical evaluation appreciated  -old chart reviewed to assess prior cultures  -monitor temps  -f/u CBC  -supportive care  2. Other issues:   -care per medicine     33 y/o male with h/o asthma and seizure disorder was admitted on 11/15 for recurrent abdominal pain. He underwent an uncomplicated laparoscopic appendectomy on 11/7/22 for acute appendicitis. He had abdominal soreness ever since surgery but was thinking it was a normal healing course. He developed subjective warmth, loss of appetite and worsen abdominal pain started 11/14/22. In ER he received zosyn.     1. Febrile syndrome. Probable sepsis. Typhlitis. Possible acute peritonitis s/p recent appendectomy. ARF. Diarrheal syndrome ?CDAD.  -leukocytosis  -no abscess noted on repeat CT abdomen  -obtain BC x 2, urine c/s  -obtain stool for CDT, GI PCR  -start zosyn 3.375 gm IV q8h  -reason for abx use and side effects reviewed with patient; monitor BMP  -surgical evaluation appreciated  -old chart reviewed to assess prior cultures  -monitor temps  -f/u CBC  -supportive care  2. Other issues:   -care per medicine

## 2022-11-15 NOTE — H&P ADULT - HISTORY OF PRESENT ILLNESS
Mr. Buitrgao is a 32 year-old gentleman with PMHx of asthma and seizure d/o, presented to ED for subjective warmth, loss of appetite and worsen abdominal pain started 11/14/22. He underwent uncomplicated laparoscopic appendectomy on 11/7/22 for acute appendicitis, has been having abdominal soreness ever since surgery but was thinking it was a normal healing course. He also endorses having dark red-coated bowel movement 2 hours ago. He denies any chest pain or shortness of breath at this time

## 2022-11-15 NOTE — CONSULT NOTE ADULT - SUBJECTIVE AND OBJECTIVE BOX
Patient is a 32y old  Male who presents with a chief complaint of Sepsis s/p lap appy     HPI:  33 y/o male with h/o asthma and seizure disorder was admitted on 11/15 for recurrent abdominal pain. He underwent an uncomplicated laparoscopic appendectomy on 22 for acute appendicitis. He had abdominal soreness ever since surgery but was thinking it was a normal healing course. He developed subjective warmth, loss of appetite and worsen abdominal pain started 22. In ER he received zosyn.     PMH: as above  PSH: as above  Meds: per reconciliation sheet, noted below  MEDICATIONS  (STANDING):  gabapentin 300 milliGRAM(s) Oral at bedtime  lactated ringers. 1000 milliLiter(s) (135 mL/Hr) IV Continuous <Continuous>  lamoTRIgine 150 milliGRAM(s) Oral two times a day  piperacillin/tazobactam IVPB.. 3.375 Gram(s) IV Intermittent every 8 hours    MEDICATIONS  (PRN):  acetaminophen     Tablet .. 650 milliGRAM(s) Oral every 6 hours PRN Moderate Pain (4 - 6)  acetaminophen     Tablet .. 650 milliGRAM(s) Oral every 6 hours PRN Moderate Pain (4 - 6)  albuterol    90 MICROgram(s) HFA Inhaler 2 Puff(s) Inhalation every 6 hours PRN for shortness of breath and/or wheezing    Allergies    No Known Allergies    Intolerances      Social: no smoking, no alcohol, no illegal drugs; no recent travel, no exposure to TB  FAMILY HISTORY:    no history of premature cardiovascular disease in first degree relatives    ROS: the patient denies HA, no seizures, no dizziness, no sore throat, no nasal congestion, no blurry vision, no CP, no palpitations, no SOB, no cough, has abdominal pain, has diarrhea, no N/V, no dysuria, no leg pain, no claudication, no rash, no joint aches, no rectal pain or bleeding, no night sweats  All other systems reviewed and are negative    Vital Signs Last 24 Hrs  T(C): 37.6 (15 Nov 2022 08:28), Max: 39.5 (2022 16:34)  T(F): 99.6 (15 Nov 2022 08:28), Max: 103.1 (2022 16:34)  HR: 102 (15 Nov 2022 08:28) (102 - 161)  BP: 109/55 (15 Nov 2022 08:28) (99/43 - 127/75)  BP(mean): 66 (15 Nov 2022 04:25) (66 - 91)  RR: 18 (15 Nov 2022 08:28) (18 - 20)  SpO2: 97% (15 Nov 2022 08:28) (97% - 100%)    Parameters below as of 15 Nov 2022 08:28  Patient On (Oxygen Delivery Method): room air      Daily Height in cm: 175.26 (2022 16:19)    Daily Weight in k.6 (15 Nov 2022 05:45)    PE:    Constitutional:  No acute distress  HEENT: NC/AT, EOMI, PERRLA, conjunctivae clear; ears and nose atraumatic; pharynx benign  Neck: supple; thyroid not palpable  Back: no tenderness  Respiratory: respiratory effort normal; clear to auscultation  Cardiovascular: S1S2 regular, no murmurs  Abdomen: soft, mid abdomen tender, mild distended, positive BS; no liver or spleen organomegaly  Genitourinary: no suprapubic tenderness  Lymphatic: no LN palpable  Musculoskeletal: no muscle tenderness, no joint swelling or tenderness  Extremities: no pedal edema  Neurological/ Psychiatric: AxOx3, judgement and insight normal; moving all extremities  Skin: no rashes; no palpable lesions    Labs: all available labs reviewed                        14.8   23.48 )-----------( 212      ( 15 Nov 2022 08:20 )             42.7     11-15    138  |  110<H>  |  14  ----------------------------<  114<H>  3.8   |  20<L>  |  1.49<H>    Ca    9.0      15 Nov 2022 08:20  Phos  2.6     11-15  Mg     1.6     11-15    TPro  7.4  /  Alb  3.4  /  TBili  0.6  /  DBili  x   /  AST  14<L>  /  ALT  42  /  AlkPhos  80  11-15     LIVER FUNCTIONS - ( 15 Nov 2022 08:20 )  Alb: 3.4 g/dL / Pro: 7.4 gm/dL / ALK PHOS: 80 U/L / ALT: 42 U/L / AST: 14 U/L / GGT: x           Urinalysis Basic - ( 15 Nov 2022 01:16 )    Color: Yellow / Appearance: Clear / S.005 / pH: x  Gluc: x / Ketone: Trace  / Bili: Negative / Urobili: Negative   Blood: x / Protein: 15 / Nitrite: Negative   Leuk Esterase: Trace / RBC: 0-2 /HPF / WBC 0-2   Sq Epi: x / Non Sq Epi: Occasional / Bacteria: Occasional    ( @ 18:16)  NotDete    Radiology: all available radiological tests reviewed    < from: CT Abdomen and Pelvis w/ IV Cont (22 @ 18:59) >  IMPRESSION: Status post appendectomy. There is no abscess. Mild   thickening of cecum may represent colitis.  < end of copied text >    Advanced directives addressed: full resuscitation

## 2022-11-15 NOTE — H&P ADULT - NSHPLABSRESULTS_GEN_ALL_CORE
Vitals:  T(C): 38 (11-14 @ 22:42), Max: 39.5 (11-14 @ 16:34)  HR: 119 (11-14 @ 22:42) (114 - 161)  BP: 99/43 (11-14 @ 22:42) (99/43 - 127/75)  RR: 18 (11-14 @ 22:42) (18 - 20)  SpO2: 97% (11-14 @ 22:42) (97% - 100%)    11-14 @ 07:01  -  11-15 @ 00:23  --------------------------------------------------------  IN:    Sodium Chloride 0.9% Bolus: 2200 mL  Total IN: 2200 mL    OUT:  Total OUT: 0 mL    Total NET: 2200 mL          11-14 @ 18:16                    16.7  CBC: 21.99>)-------(<275                     46.6                 139 | 107 | 13    CMP:  ----------------------< 108               3.7 | 24 | 1.32                      Ca:9.7  Phos:-  Mg:-               0.6|      |18        LFTs:  ------|111|-----             -|      |-      Current Inpatient Medications:  albuterol    90 MICROgram(s) HFA Inhaler 2 Puff(s) Inhalation every 6 hours PRN  gabapentin 300 milliGRAM(s) Oral at bedtime  lactated ringers Bolus 1000 milliLiter(s) IV Bolus once  lactated ringers. 1000 milliLiter(s) (135 mL/Hr) IV Continuous <Continuous>  lamoTRIgine 150 milliGRAM(s) Oral two times a day        Imaging-  CT A/P 11/14/22    COMPARISON: 11.7.22.    CONTRAST/COMPLICATIONS:  IV Contrast: Omnipaque 350 90 cc administered 10 cc discarded  Oral Contrast: NONE  Complications: None reported at time of study completion    PROCEDURE:  CT of the Abdomen and Pelvis was performed.  Sagittal and coronal reformats were performed.    FINDINGS:    LOWER CHEST: Within normal limits.    LIVER: Hepatic steatosis.  BILE DUCTS: Normal caliber.  GALLBLADDER: Within normal limits.  SPLEEN: Within normal limits.  PANCREAS: Within normal limits.  ADRENALS: Within normal limits.  KIDNEYS/URETERS: Within normal limits.    BLADDER: Within normal limits.  REPRODUCTIVE ORGANS: Within normal limits.    BOWEL: No bowel obstruction. Status post appendectomy. There is mild thickening of the cecum.  PERITONEUM: No ascites.  VESSELS: Within normal limits.  RETROPERITONEUM/LYMPH NODES: No lymphadenopathy.  ABDOMINAL WALL: Within normal limits.  BONES: Within normal limits.    IMPRESSION: Status post appendectomy. There is no abscess. Mild thickening of cecum may represent colitis.

## 2022-11-15 NOTE — CONSULT NOTE ADULT - SUBJECTIVE AND OBJECTIVE BOX
HPI:  Mr. Buitrago is a 32 year-old gentleman with PMHx of asthma and seizure d/o, presented to ED for subjective warmth, loss of appetite and worsen abdominal pain started 22. He underwent uncomplicated laparoscopic appendectomy on 22 for acute appendicitis, has been having abdominal soreness ever since surgery but was thinking it was a normal healing course. He also endorses having dark red-coated bowel movement 2 hours ago. He denies any chest pain or shortness of breath at this time   (15 Nov 2022 00:10)      PAST MEDICAL & SURGICAL HISTORY:  Seizure      Asthma      Asthma      H/O fracture of finger          MEDICATIONS  (STANDING):  gabapentin 300 milliGRAM(s) Oral at bedtime  lactated ringers. 1000 milliLiter(s) (135 mL/Hr) IV Continuous <Continuous>  lamoTRIgine 150 milliGRAM(s) Oral two times a day  piperacillin/tazobactam IVPB.. 3.375 Gram(s) IV Intermittent every 8 hours    MEDICATIONS  (PRN):  acetaminophen     Tablet .. 650 milliGRAM(s) Oral every 6 hours PRN Moderate Pain (4 - 6)  acetaminophen     Tablet .. 650 milliGRAM(s) Oral every 6 hours PRN Moderate Pain (4 - 6)  albuterol    90 MICROgram(s) HFA Inhaler 2 Puff(s) Inhalation every 6 hours PRN for shortness of breath and/or wheezing      Allergies    No Known Allergies    Intolerances        SOCIAL HISTORY:    FAMILY HISTORY:    REVIEW OF SYSTEMS:    CONSTITUTIONAL: No weakness, fevers or chills  EYES/ENT: No visual changes;  No vertigo or throat pain   NECK: No pain or stiffness  RESPIRATORY: No cough, wheezing, hemoptysis; No shortness of breath  CARDIOVASCULAR: No chest pain or palpitations  GASTROINTESTINAL: No abdominal or epigastric pain. No nausea, vomiting, or hematemesis; No diarrhea or constipation. No melena or hematochezia.  GENITOURINARY: No dysuria, frequency or hematuria  NEUROLOGICAL: No numbness or weakness  SKIN: No itching, burning, rashes, or lesions   All other review of systems is negative unless indicated above.  Vital Signs Last 24 Hrs  T(C): 37.6 (15 Nov 2022 08:28), Max: 39.5 (2022 16:34)  T(F): 99.6 (15 Nov 2022 08:28), Max: 103.1 (2022 16:34)  HR: 102 (15 Nov 2022 08:28) (102 - 161)  BP: 109/55 (15 Nov 2022 08:28) (99/43 - 127/75)  BP(mean): 66 (15 Nov 2022 04:25) (66 - 91)  RR: 18 (15 Nov 2022 08:28) (18 - 20)  SpO2: 97% (15 Nov 2022 08:28) (97% - 100%)    Parameters below as of 15 Nov 2022 08:28  Patient On (Oxygen Delivery Method): room air      PHYSICAL EXAM:    General: Well developed; well nourished; in no acute distress  Eyes: PERRLA, EOMI; conjunctiva and sclera clear  Head: Normocephalic; atraumatic  ENMT: No nasal discharge; airway clear  Neck: Supple; non tender; no masses  Respiratory: No wheezes, rales or rhonchi  Cardiovascular: Regular rate and rhythm. S1 and S2 Normal; No murmurs, gallops or rubs  Gastrointestinal: Soft non-tender non-distended; Normal bowel sounds  Genitourinary: No  suprapubic  tenderness  Extremities: Normal range of motion, No clubbing, cyanosis or edema  Vascular: Peripheral pulses palpable 2+ bilaterally  Neurological: Alert and oriented x4  Skin: Warm and dry. No acute rash  Lymph Nodes: No acute cervical adenopathy  Musculoskeletal: Normal muscle tone, without deformities  Psychiatric: Cooperative and appropriate      LABS:                        14.8   23.48 )-----------( 212      ( 15 Nov 2022 08:20 )             42.7     11-15    138  |  110<H>  |  14  ----------------------------<  114<H>  3.8   |  20<L>  |  1.49<H>    Ca    9.0      15 Nov 2022 08:20  Phos  2.6     11-15  Mg     1.6     11-15    TPro  7.4  /  Alb  3.4  /  TBili  0.6  /  DBili  x   /  AST  14<L>  /  ALT  42  /  AlkPhos  80  11-15    PT/INR - ( 2022 18:16 )   PT: 13.1 sec;   INR: 1.13 ratio         PTT - ( 2022 18:16 )  PTT:38.1 sec  Urinalysis Basic - ( 15 Nov 2022 01:16 )    Color: Yellow / Appearance: Clear / S.005 / pH: x  Gluc: x / Ketone: Trace  / Bili: Negative / Urobili: Negative   Blood: x / Protein: 15 / Nitrite: Negative   Leuk Esterase: Trace / RBC: 0-2 /HPF / WBC 0-2   Sq Epi: x / Non Sq Epi: Occasional / Bacteria: Occasional        RADIOLOGY & ADDITIONAL STUDIES: HPI: 32 year-old gentleman with PMHx of asthma and seizure d/o, presented to ED for subjective warmth, chills,  loss of appetite and  abdominal pain started 22. He underwent uncomplicated laparoscopic appendectomy on 22 for acute appendicitis, has been having abdominal soreness ever since surgery but was thinking it was a normal healing course. He also endorses having dark red-coated bowel movement  few  hours PTA.    Pt was evaluated in ED: found to be febrile at 103, tachycardic borderline BP.  CT abd/pelvis with Cecal wall thickening, no abscess.  Labs with leukocytosis Was fgiven IVF Bolus, IV abxs, Cultures sent. Admitted to Sx service.  Hospitalist serviced called for consult   Pt developed diarrhea, has multiple  loose stools.   Pt was seen and examined, reports with cramping pain, better after BM, has multiple loose stools. No chills. Tolerates liquids. Denies dizziness or SOB.     PAST MEDICAL & SURGICAL HISTORY:  Seizure  Asthma  H/O fracture of finger  Appendectomy         MEDICATIONS  (STANDING):  gabapentin 300 milliGRAM(s) Oral at bedtime  lactated ringers. 1000 milliLiter(s) (135 mL/Hr) IV Continuous <Continuous>  lamoTRIgine 150 milliGRAM(s) Oral two times a day  piperacillin/tazobactam IVPB.. 3.375 Gram(s) IV Intermittent every 8 hours    MEDICATIONS  (PRN):  acetaminophen     Tablet .. 650 milliGRAM(s) Oral every 6 hours PRN Moderate Pain (4 - 6)  acetaminophen     Tablet .. 650 milliGRAM(s) Oral every 6 hours PRN Moderate Pain (4 - 6)  albuterol    90 MICROgram(s) HFA Inhaler 2 Puff(s) Inhalation every 6 hours PRN for shortness of breath and/or wheezing      Allergies  No Known Allergies      SOCIAL HISTORY: denies smoking, alcohol  or drug use     FAMILY HISTORY:  Reports both parents are healthy     REVIEW OF SYSTEMS:  All other review of systems is negative unless indicated above.      Vital Signs Last 24 Hrs  T(C): 37.6 (15 Nov 2022 08:28), Max: 39.5 (2022 16:34)  T(F): 99.6 (15 Nov 2022 08:28), Max: 103.1 (2022 16:34)  HR: 102 (15 Nov 2022 08:28) (102 - 161)  BP: 109/55 (15 Nov 2022 08:28) (99/43 - 127/75)  BP(mean): 66 (15 Nov 2022 04:25) (66 - 91)  RR: 18 (15 Nov 2022 08:28) (18 - 20)  SpO2: 97% (15 Nov 2022 08:28) (97% - 100%)    Parameters below as of 15 Nov 2022 08:28  Patient On (Oxygen Delivery Method): room air      PHYSICAL EXAM:  General: Well developed; well nourished; Uncomfortable   Eyes: EOMI; conjunctiva and sclera clear  Head: Normocephalic; atraumatic  ENMT: No nasal discharge; airway clear  Neck: Supple; non tender; no masses  Respiratory: No wheezes, rales or rhonchi  Cardiovascular: Regular rate and rhythm. S1 and S2 Normal;  Gastrointestinal: Soft, mildly distended,  mildly tender at lower abd,  no rebound, +  bowel sounds  Genitourinary: No  suprapubic  tenderness  Extremities: No  edema  Vascular: Peripheral pulses palpable 2+ bilaterally  Neurological: Alert and oriented x4, non focal   Skin: Warm and dry. No acute rash  Musculoskeletal: Normal muscle tone, without deformities  Psychiatric: Cooperative and appropriate      LABS:                        14.8   23.48 )-----------( 212      ( 15 Nov 2022 08:20 )             42.7     11-15    138  |  110<H>  |  14  ----------------------------<  114<H>  3.8   |  20<L>  |  1.49<H>    Ca    9.0      15 Nov 2022 08:20  Phos  2.6     11-15  Mg     1.6     11-15    TPro  7.4  /  Alb  3.4  /  TBili  0.6  /  DBili  x   /  AST  14<L>  /  ALT  42  /  AlkPhos  80  11-15  PT/INR - ( 2022 18:16 )   PT: 13.1 sec;   INR: 1.13 ratio    PTT - ( 2022 18:16 )  PTT:38.1 sec      Urinalysis Basic - ( 15 Nov 2022 01:16 )  Color: Yellow / Appearance: Clear / S.005 / pH: x  Gluc: x / Ketone: Trace  / Bili: Negative / Urobili: Negative   Blood: x / Protein: 15 / Nitrite: Negative   Leuk Esterase: Trace / RBC: 0-2 /HPF / WBC 0-2   Sq Epi: x / Non Sq Epi: Occasional / Bacteria: Occasional        RADIOLOGY & ADDITIONAL STUDIES:  < from: CT Abdomen and Pelvis w/ IV Cont (22 @ 18:59) >  FINDINGS:    LOWER CHEST: Within normal limits.    LIVER: Hepatic steatosis.  BILE DUCTS: Normal caliber.  GALLBLADDER: Within normal limits.  SPLEEN: Within normal limits.  PANCREAS: Within normal limits.  ADRENALS: Within normal limits.  KIDNEYS/URETERS: Within normal limits.    BLADDER: Within normal limits.  REPRODUCTIVE ORGANS: Within normal limits.    BOWEL: No bowel obstruction. Status post appendectomy.  There is mild   thickening of the cecum.  PERITONEUM: No ascites.  VESSELS:  Within normal limits.  RETROPERITONEUM/LYMPH NODES: No lymphadenopathy.  ABDOMINAL WALL: Within normal limits.  BONES: Within normal limits.    IMPRESSION: Status post appendectomy. There is no abscess. Mild   thickening of cecum may represent colitis.

## 2022-11-16 LAB
ANION GAP SERPL CALC-SCNC: 4 MMOL/L — LOW (ref 5–17)
BUN SERPL-MCNC: 7 MG/DL — SIGNIFICANT CHANGE UP (ref 7–23)
C DIFF BY PCR RESULT: DETECTED
CALCIUM SERPL-MCNC: 9 MG/DL — SIGNIFICANT CHANGE UP (ref 8.5–10.1)
CHLORIDE SERPL-SCNC: 116 MMOL/L — HIGH (ref 96–108)
CO2 SERPL-SCNC: 22 MMOL/L — SIGNIFICANT CHANGE UP (ref 22–31)
CREAT SERPL-MCNC: 1.17 MG/DL — SIGNIFICANT CHANGE UP (ref 0.5–1.3)
CULTURE RESULTS: SIGNIFICANT CHANGE UP
EGFR: 85 ML/MIN/1.73M2 — SIGNIFICANT CHANGE UP
GLUCOSE SERPL-MCNC: 95 MG/DL — SIGNIFICANT CHANGE UP (ref 70–99)
HCT VFR BLD CALC: 42.5 % — SIGNIFICANT CHANGE UP (ref 39–50)
HGB BLD-MCNC: 14.7 G/DL — SIGNIFICANT CHANGE UP (ref 13–17)
MAGNESIUM SERPL-MCNC: 2.2 MG/DL — SIGNIFICANT CHANGE UP (ref 1.6–2.6)
MCHC RBC-ENTMCNC: 30.6 PG — SIGNIFICANT CHANGE UP (ref 27–34)
MCHC RBC-ENTMCNC: 34.6 GM/DL — SIGNIFICANT CHANGE UP (ref 32–36)
MCV RBC AUTO: 88.5 FL — SIGNIFICANT CHANGE UP (ref 80–100)
PHOSPHATE SERPL-MCNC: 2 MG/DL — LOW (ref 2.5–4.5)
PLATELET # BLD AUTO: 208 K/UL — SIGNIFICANT CHANGE UP (ref 150–400)
POTASSIUM SERPL-MCNC: 3.8 MMOL/L — SIGNIFICANT CHANGE UP (ref 3.5–5.3)
POTASSIUM SERPL-SCNC: 3.8 MMOL/L — SIGNIFICANT CHANGE UP (ref 3.5–5.3)
RBC # BLD: 4.8 M/UL — SIGNIFICANT CHANGE UP (ref 4.2–5.8)
RBC # FLD: 13 % — SIGNIFICANT CHANGE UP (ref 10.3–14.5)
SODIUM SERPL-SCNC: 142 MMOL/L — SIGNIFICANT CHANGE UP (ref 135–145)
SPECIMEN SOURCE: SIGNIFICANT CHANGE UP
WBC # BLD: 15.5 K/UL — HIGH (ref 3.8–10.5)
WBC # FLD AUTO: 15.5 K/UL — HIGH (ref 3.8–10.5)

## 2022-11-16 PROCEDURE — 99233 SBSQ HOSP IP/OBS HIGH 50: CPT

## 2022-11-16 RX ADMIN — LAMOTRIGINE 150 MILLIGRAM(S): 25 TABLET, ORALLY DISINTEGRATING ORAL at 21:22

## 2022-11-16 RX ADMIN — HEPARIN SODIUM 5000 UNIT(S): 5000 INJECTION INTRAVENOUS; SUBCUTANEOUS at 06:19

## 2022-11-16 RX ADMIN — Medication 300 MILLIGRAM(S): at 21:23

## 2022-11-16 RX ADMIN — LAMOTRIGINE 150 MILLIGRAM(S): 25 TABLET, ORALLY DISINTEGRATING ORAL at 10:20

## 2022-11-16 RX ADMIN — Medication 125 MILLIGRAM(S): at 17:12

## 2022-11-16 RX ADMIN — Medication 125 MILLIGRAM(S): at 06:19

## 2022-11-16 RX ADMIN — GABAPENTIN 300 MILLIGRAM(S): 400 CAPSULE ORAL at 21:22

## 2022-11-16 RX ADMIN — PIPERACILLIN AND TAZOBACTAM 25 GRAM(S): 4; .5 INJECTION, POWDER, LYOPHILIZED, FOR SOLUTION INTRAVENOUS at 14:34

## 2022-11-16 RX ADMIN — SODIUM CHLORIDE 135 MILLILITER(S): 9 INJECTION, SOLUTION INTRAVENOUS at 06:18

## 2022-11-16 RX ADMIN — Medication 125 MILLIGRAM(S): at 14:33

## 2022-11-16 RX ADMIN — HEPARIN SODIUM 5000 UNIT(S): 5000 INJECTION INTRAVENOUS; SUBCUTANEOUS at 21:22

## 2022-11-16 RX ADMIN — PIPERACILLIN AND TAZOBACTAM 25 GRAM(S): 4; .5 INJECTION, POWDER, LYOPHILIZED, FOR SOLUTION INTRAVENOUS at 06:19

## 2022-11-16 NOTE — PROGRESS NOTE ADULT - ASSESSMENT
33 y/o male with h/o asthma and seizure disorder was admitted on 11/15 for recurrent abdominal pain. He underwent an uncomplicated laparoscopic appendectomy on 11/7/22 for acute appendicitis. He had abdominal soreness ever since surgery but was thinking it was a normal healing course. He developed subjective warmth, loss of appetite and worsen abdominal pain started 11/14/22. In ER he received zosyn.     1. Febrile syndrome improving. Probable sepsis. Typhlitis. Possible acute peritonitis s/p recent appendectomy. ARF. Diarrheal syndrome ?CDAD.  -leukocytosis is improving  -still with abdominal cramps  -no abscess noted on repeat CT abdomen  -BC x 2, urine c/s noted  -stool for CDT, GI PCR collected  -on zosyn 3.375 gm IV q8h # 2 and vancomycin 125 mg PO q6h # 2  -tolerating abx well so far; no side effects noted  -surgical evaluation appreciated  -advance diet per surgery  -continue abx coverage  -monitor temps  -f/u CBC  -supportive care  2. Other issues:   -care per medicine

## 2022-11-16 NOTE — PROGRESS NOTE ADULT - ASSESSMENT
32 year old man, s/p lap appy on 11/7/22, presented with septic symptoms, now clinically much better. White count down to 15 this AM. Still complaining of loose BM    Plan:  - f/u C diff stool PCR, specimen sent  - ID recs: c.w zosyn and PO vanc, daily CBC  - c/w CLD  - serial abdominal exam  - monitor vitals  - Ambulate as tolerated  - Daily labs, f/u cbc    Plan will be discussed with Dr. Fitzgerald

## 2022-11-17 LAB
ANION GAP SERPL CALC-SCNC: 6 MMOL/L — SIGNIFICANT CHANGE UP (ref 5–17)
BUN SERPL-MCNC: 7 MG/DL — SIGNIFICANT CHANGE UP (ref 7–23)
CALCIUM SERPL-MCNC: 8.9 MG/DL — SIGNIFICANT CHANGE UP (ref 8.5–10.1)
CHLORIDE SERPL-SCNC: 114 MMOL/L — HIGH (ref 96–108)
CO2 SERPL-SCNC: 20 MMOL/L — LOW (ref 22–31)
CREAT SERPL-MCNC: 1.09 MG/DL — SIGNIFICANT CHANGE UP (ref 0.5–1.3)
EGFR: 93 ML/MIN/1.73M2 — SIGNIFICANT CHANGE UP
GLUCOSE SERPL-MCNC: 95 MG/DL — SIGNIFICANT CHANGE UP (ref 70–99)
HCT VFR BLD CALC: 40.2 % — SIGNIFICANT CHANGE UP (ref 39–50)
HGB BLD-MCNC: 14.2 G/DL — SIGNIFICANT CHANGE UP (ref 13–17)
MAGNESIUM SERPL-MCNC: 2 MG/DL — SIGNIFICANT CHANGE UP (ref 1.6–2.6)
MCHC RBC-ENTMCNC: 31 PG — SIGNIFICANT CHANGE UP (ref 27–34)
MCHC RBC-ENTMCNC: 35.3 GM/DL — SIGNIFICANT CHANGE UP (ref 32–36)
MCV RBC AUTO: 87.8 FL — SIGNIFICANT CHANGE UP (ref 80–100)
PHOSPHATE SERPL-MCNC: 2.7 MG/DL — SIGNIFICANT CHANGE UP (ref 2.5–4.5)
PLATELET # BLD AUTO: 223 K/UL — SIGNIFICANT CHANGE UP (ref 150–400)
POTASSIUM SERPL-MCNC: 3.7 MMOL/L — SIGNIFICANT CHANGE UP (ref 3.5–5.3)
POTASSIUM SERPL-SCNC: 3.7 MMOL/L — SIGNIFICANT CHANGE UP (ref 3.5–5.3)
RBC # BLD: 4.58 M/UL — SIGNIFICANT CHANGE UP (ref 4.2–5.8)
RBC # FLD: 13.3 % — SIGNIFICANT CHANGE UP (ref 10.3–14.5)
SODIUM SERPL-SCNC: 140 MMOL/L — SIGNIFICANT CHANGE UP (ref 135–145)
WBC # BLD: 9.97 K/UL — SIGNIFICANT CHANGE UP (ref 3.8–10.5)
WBC # FLD AUTO: 9.97 K/UL — SIGNIFICANT CHANGE UP (ref 3.8–10.5)

## 2022-11-17 PROCEDURE — 99233 SBSQ HOSP IP/OBS HIGH 50: CPT

## 2022-11-17 RX ADMIN — Medication 125 MILLIGRAM(S): at 06:03

## 2022-11-17 RX ADMIN — PIPERACILLIN AND TAZOBACTAM 25 GRAM(S): 4; .5 INJECTION, POWDER, LYOPHILIZED, FOR SOLUTION INTRAVENOUS at 06:03

## 2022-11-17 RX ADMIN — LAMOTRIGINE 150 MILLIGRAM(S): 25 TABLET, ORALLY DISINTEGRATING ORAL at 21:33

## 2022-11-17 RX ADMIN — PIPERACILLIN AND TAZOBACTAM 25 GRAM(S): 4; .5 INJECTION, POWDER, LYOPHILIZED, FOR SOLUTION INTRAVENOUS at 14:02

## 2022-11-17 RX ADMIN — HEPARIN SODIUM 5000 UNIT(S): 5000 INJECTION INTRAVENOUS; SUBCUTANEOUS at 06:03

## 2022-11-17 RX ADMIN — Medication 300 MILLIGRAM(S): at 21:34

## 2022-11-17 RX ADMIN — SODIUM CHLORIDE 135 MILLILITER(S): 9 INJECTION, SOLUTION INTRAVENOUS at 14:02

## 2022-11-17 RX ADMIN — Medication 125 MILLIGRAM(S): at 22:25

## 2022-11-17 RX ADMIN — PIPERACILLIN AND TAZOBACTAM 25 GRAM(S): 4; .5 INJECTION, POWDER, LYOPHILIZED, FOR SOLUTION INTRAVENOUS at 00:13

## 2022-11-17 RX ADMIN — Medication 125 MILLIGRAM(S): at 00:13

## 2022-11-17 RX ADMIN — LAMOTRIGINE 150 MILLIGRAM(S): 25 TABLET, ORALLY DISINTEGRATING ORAL at 09:24

## 2022-11-17 RX ADMIN — HEPARIN SODIUM 5000 UNIT(S): 5000 INJECTION INTRAVENOUS; SUBCUTANEOUS at 14:02

## 2022-11-17 RX ADMIN — Medication 125 MILLIGRAM(S): at 18:40

## 2022-11-17 RX ADMIN — GABAPENTIN 300 MILLIGRAM(S): 400 CAPSULE ORAL at 21:33

## 2022-11-17 RX ADMIN — Medication 125 MILLIGRAM(S): at 12:09

## 2022-11-17 RX ADMIN — PIPERACILLIN AND TAZOBACTAM 25 GRAM(S): 4; .5 INJECTION, POWDER, LYOPHILIZED, FOR SOLUTION INTRAVENOUS at 21:33

## 2022-11-17 NOTE — PROGRESS NOTE ADULT - ASSESSMENT
32 year old man, s/p lap appy on 11/7/22, presented with septic symptoms, now clinically much better. C diff turned out to be positive. White count down to 9 this AM. Still complaining of loose BM    Plan:  - C/w PO vanc and zosyn  - ID recs: c.w zosyn and PO vanc  - c/w CLD  - serial abdominal exam  - monitor vitals  - Ambulate as tolerated  - Daily labs, f/u cbc    Plan will be discussed with Dr. Fitzgerald

## 2022-11-17 NOTE — PROGRESS NOTE ADULT - ASSESSMENT
33 y/o male with h/o asthma and seizure disorder was admitted on 11/15 for recurrent abdominal pain. He underwent an uncomplicated laparoscopic appendectomy on 11/7/22 for acute appendicitis. He had abdominal soreness ever since surgery but was thinking it was a normal healing course. He developed subjective warmth, loss of appetite and worsen abdominal pain started 11/14/22. In ER he received zosyn.     1. Febrile syndrome resolving. CDAD. Typhlitis. Possible acute peritonitis s/p recent appendectomy. ARF resolving. Diarrheal syndrome CDAD.  -leukocytosis is improving  -abdominal cramps improving  -no abscess noted on repeat CT abdomen  -BC x 2, urine c/s noted  -stool for CDT noted  -on zosyn 3.375 gm IV q8h # 3 and vancomycin 125 mg PO q6h # 3  -tolerating abx well so far; no side effects noted  -surgical evaluation appreciated  -advance diet per surgery  -continue current abx coverage  -monitor abdomen  -monitor temps  -f/u CBC  -supportive care  2. Other issues:   -care per medicine

## 2022-11-17 NOTE — PROGRESS NOTE ADULT - ATTENDING COMMENTS
Patient is feeling better, BM becoming less loose and more formed, leukocytosis improved, and tolerating regular diet      Plan  - IV lock  - Will c/w current Abx per ID  - AMbulate  - DVT ppx

## 2022-11-18 ENCOUNTER — TRANSCRIPTION ENCOUNTER (OUTPATIENT)
Age: 32
End: 2022-11-18

## 2022-11-18 VITALS
RESPIRATION RATE: 99 BRPM | HEART RATE: 78 BPM | TEMPERATURE: 98 F | DIASTOLIC BLOOD PRESSURE: 80 MMHG | SYSTOLIC BLOOD PRESSURE: 116 MMHG

## 2022-11-18 LAB
HCT VFR BLD CALC: 44.2 % — SIGNIFICANT CHANGE UP (ref 39–50)
HGB BLD-MCNC: 15.3 G/DL — SIGNIFICANT CHANGE UP (ref 13–17)
MCHC RBC-ENTMCNC: 30.7 PG — SIGNIFICANT CHANGE UP (ref 27–34)
MCHC RBC-ENTMCNC: 34.6 GM/DL — SIGNIFICANT CHANGE UP (ref 32–36)
MCV RBC AUTO: 88.6 FL — SIGNIFICANT CHANGE UP (ref 80–100)
PLATELET # BLD AUTO: 218 K/UL — SIGNIFICANT CHANGE UP (ref 150–400)
RBC # BLD: 4.99 M/UL — SIGNIFICANT CHANGE UP (ref 4.2–5.8)
RBC # FLD: 13.2 % — SIGNIFICANT CHANGE UP (ref 10.3–14.5)
WBC # BLD: 8.49 K/UL — SIGNIFICANT CHANGE UP (ref 3.8–10.5)
WBC # FLD AUTO: 8.49 K/UL — SIGNIFICANT CHANGE UP (ref 3.8–10.5)

## 2022-11-18 PROCEDURE — 99239 HOSP IP/OBS DSCHRG MGMT >30: CPT

## 2022-11-18 RX ORDER — VANCOMYCIN HCL 1 G
1 VIAL (EA) INTRAVENOUS
Qty: 44 | Refills: 0
Start: 2022-11-18 | End: 2022-11-28

## 2022-11-18 RX ORDER — VANCOMYCIN HCL 1 G
1 VIAL (EA) INTRAVENOUS
Qty: 72 | Refills: 0
Start: 2022-11-18 | End: 2022-11-29

## 2022-11-18 RX ADMIN — LAMOTRIGINE 150 MILLIGRAM(S): 25 TABLET, ORALLY DISINTEGRATING ORAL at 09:45

## 2022-11-18 RX ADMIN — Medication 125 MILLIGRAM(S): at 05:56

## 2022-11-18 RX ADMIN — Medication 125 MILLIGRAM(S): at 11:30

## 2022-11-18 RX ADMIN — PIPERACILLIN AND TAZOBACTAM 25 GRAM(S): 4; .5 INJECTION, POWDER, LYOPHILIZED, FOR SOLUTION INTRAVENOUS at 05:58

## 2022-11-18 NOTE — PROGRESS NOTE ADULT - PROVIDER SPECIALTY LIST ADULT
Hospitalist
Hospitalist
Infectious Disease
Infectious Disease
Surgery
Infectious Disease
Surgery
Surgery

## 2022-11-18 NOTE — PROGRESS NOTE ADULT - ATTENDING COMMENTS
Patient is feeling better and having less diarrhea. Patient stable for d/c home on oral vancomycin and follow-up in office

## 2022-11-18 NOTE — DISCHARGE NOTE PROVIDER - NSDCCPCAREPLAN_GEN_ALL_CORE_FT
PRINCIPAL DISCHARGE DIAGNOSIS  Diagnosis: C. difficile colitis  Assessment and Plan of Treatment:        PRINCIPAL DISCHARGE DIAGNOSIS  Diagnosis: C. difficile colitis  Assessment and Plan of Treatment: - Febrile syndrome resolving. CDAD. Typhlitis. Possible acute peritonitis s/p recent appendectomy. ARF resolving. Diarrheal syndrome CDAD.  - BC x 2, urine c/s noted  - stool for CDT noted  - Vancomycin 125 mg PO q6h # 3/14  - tolerating abx well so far; no side effects noted  - tolerating diet well  - complete zosyn today

## 2022-11-18 NOTE — PROGRESS NOTE ADULT - SUBJECTIVE AND OBJECTIVE BOX
32 year-old gentleman with PMHx of asthma and seizure d/o, presented to ED for subjective warmth, chills,  loss of appetite and  abdominal pain started 22. He underwent uncomplicated laparoscopic appendectomy on 22 for acute appendicitis, has been having abdominal soreness ever since surgery but was thinking it was a normal healing course. He also endorses having dark red-coated bowel movement  few  hours PTA.    Pt was evaluated in ED: found to be febrile at 103, tachycardic borderline BP.  CT abd/pelvis with Cecal wall thickening, no abscess.  Labs with leukocytosis Was fgiven IVF Bolus, IV abxs, Cultures sent. Admitted to Sx service.  Hospitalist serviced called for consult   Pt developed diarrhea, has multiple  loose stools.       Medical progress: tolerating po diet well. still with diarrhea. Denies any HA, CP, SOB. No abdominal pain  Complaints: no new complaints  State of mind: normal      REVIEW OF SYSTEMS:  General: NAD, hemodynamically stable, (-)  fever, (-) chills, (-) weakness  HEENT:  Eyes:  No visual loss, blurred vision, double vision or yellow sclerae. Ears, Nose, Throat:  No hearing loss, sneezing, congestion, runny nose or sore throat.  SKIN:  No rash or itching.  CARDIOVASCULAR:  No chest pain, chest pressure or chest discomfort. No palpitations or edema.  RESPIRATORY:  No shortness of breath, cough or sputum.  GASTROINTESTINAL:  No anorexia, nausea, vomiting or diarrhea. No abdominal pain or blood.  NEUROLOGICAL:  No headache, dizziness, syncope, paralysis, ataxia, numbness or tingling in the extremities. No change in bowel or bladder control.  MUSCULOSKELETAL:  No muscle, back pain, joint pain or stiffness.  HEMATOLOGIC:  No anemia, bleeding or bruising.  LYMPHATICS:  No enlarged nodes. No history of splenectomy.  ENDOCRINOLOGIC:  No reports of sweating, cold or heat intolerance. No polyuria or polydipsia.  ALLERGIES:  No history of asthma, hives, eczema or rhinitis.    Physical Exam:   GENERAL APPEARANCE:  NAD, hemodynamically stable  T(C): 36.6 (22 @ 08:27), Max: 37.6 (11-15-22 @ 19:01)  HR: 74 (22 @ 08:27) (74 - 98)  BP: 113/75 (22 @ 08:27) (113/75 - 124/81)  RR: 18 (22 @ 08:27) (18 - 18)  SpO2: 99% (22 @ 08:27) (96% - 100%)  Wt(kg): --  HEENT:  Head is normocephalic    Skin:  Warm and dry without any rash   NECK:  Supple without lymphadenopathy.   HEART:  Regular rate and rhythm. normal S1 and S2, No M/R/G  LUNGS:  Good ins/exp effort, no W/R/R/C  ABDOMEN:  Soft, nontender, nondistended with good bowel sounds heard  EXTREMITIES:  Without cyanosis, clubbing or edema.   NEUROLOGICAL:  Gross nonfocal       CBC Full  -  ( 2022 08:29 )  WBC Count : 15.50 K/uL  RBC Count : 4.80 M/uL  Hemoglobin : 14.7 g/dL  Hematocrit : 42.5 %  Platelet Count - Automated : 208 K/uL  Mean Cell Volume : 88.5 fl  Mean Cell Hemoglobin : 30.6 pg  Mean Cell Hemoglobin Concentration : 34.6 gm/dL  Auto Neutrophil # : x  Auto Lymphocyte # : x  Auto Monocyte # : x  Auto Eosinophil # : x  Auto Basophil # : x  Auto Neutrophil % : x  Auto Lymphocyte % : x  Auto Monocyte % : x  Auto Eosinophil % : x  Auto Basophil % : x    PT/INR - ( 2022 18:16 )   PT: 13.1 sec;   INR: 1.13 ratio         PTT - ( 2022 18:16 )  PTT:38.1 sec  Urinalysis Basic - ( 15 Nov 2022 01:16 )    Color: Yellow / Appearance: Clear / S.005 / pH: x  Gluc: x / Ketone: Trace  / Bili: Negative / Urobili: Negative   Blood: x / Protein: 15 / Nitrite: Negative   Leuk Esterase: Trace / RBC: 0-2 /HPF / WBC 0-2   Sq Epi: x / Non Sq Epi: Occasional / Bacteria: Occasional      -    142  |  116<H>  |  7   ----------------------------<  95  3.8   |  22  |  1.17    Ca    9.0      2022 08:29  Phos  2.0     -  Mg     2.2     -    TPro  7.4  /  Alb  3.4  /  TBili  0.6  /  DBili  x   /  AST  14<L>  /  ALT  42  /  AlkPhos  80  11-15      32 year-old gentleman with PMHx of asthma and seizure d/o, resent Appendectomy admitted for:     # Febrile syndrome in the setting of colitis / c. diff infection  - CT of the abdomen  - stool for CDT, GI PCR collected  - on zosyn 3.375 gm IV q8h # 2 and vancomycin 125 mg PO q6h # 2  - tolerating abx well so far; no side effects noted  - surgical evaluation appreciated  - advance diet per surgery    # TONIE, suspect ATN   due to contrast nephropathy   - resolved  - Avoid nephrotoxic meds   - C/w IVF   - If no improvement of renal Fx consider renal eval     # Asthma, stable  - Monitor Pulse ox, supplement via NC PRN  - C/w albuterol PRN     # H/o Seizure disorder   - Monitor closely   - C/w Gabapentin, Lamotrigine and Zonisamide     # DVT PPX: heparin SQ         
  32 year-old gentleman with PMHx of asthma and seizure d/o, presented to ED for subjective warmth, chills,  loss of appetite and  abdominal pain started 22. He underwent uncomplicated laparoscopic appendectomy on 22 for acute appendicitis, has been having abdominal soreness ever since surgery but was thinking it was a normal healing course. He also endorses having dark red-coated bowel movement  few  hours PTA.    Pt was evaluated in ED: found to be febrile at 103, tachycardic borderline BP.  CT abd/pelvis with Cecal wall thickening, no abscess.  Labs with leukocytosis Was fgiven IVF Bolus, IV abxs, Cultures sent. Admitted to Sx service.  Hospitalist serviced called for consult   Pt developed diarrhea, has multiple  loose stools.   Pt was seen and examined, reports with cramping pain, better after BM, has multiple loose stools. No chills. Tolerates liquids. Denies dizziness or SOB.       REVIEW OF SYSTEMS:  General: NAD, hemodynamically stable, (-)  fever, (-) chills, (-) weakness  HEENT:  Eyes:  No visual loss, blurred vision, double vision or yellow sclerae. Ears, Nose, Throat:  No hearing loss, sneezing, congestion, runny nose or sore throat.  SKIN:  No rash or itching.  CARDIOVASCULAR:  No chest pain, chest pressure or chest discomfort. No palpitations or edema.  RESPIRATORY:  No shortness of breath, cough or sputum.  GASTROINTESTINAL:  No anorexia, nausea, vomiting or diarrhea. No abdominal pain or blood.  NEUROLOGICAL:  No headache, dizziness, syncope, paralysis, ataxia, numbness or tingling in the extremities. No change in bowel or bladder control.  MUSCULOSKELETAL:  No muscle, back pain, joint pain or stiffness.  HEMATOLOGIC:  No anemia, bleeding or bruising.  LYMPHATICS:  No enlarged nodes. No history of splenectomy.  ENDOCRINOLOGIC:  No reports of sweating, cold or heat intolerance. No polyuria or polydipsia.  ALLERGIES:  No history of asthma, hives, eczema or rhinitis.    Physical Exam:   GENERAL APPEARANCE:  NAD, hemodynamically stable  T(C): 36.6 (22 @ 08:27), Max: 37.6 (11-15-22 @ 19:01)  HR: 74 (22 @ 08:27) (74 - 98)  BP: 113/75 (22 @ 08:27) (113/75 - 124/81)  RR: 18 (22 @ 08:27) (18 - 18)  SpO2: 99% (22 @ 08:27) (96% - 100%)  Wt(kg): --  HEENT:  Head is normocephalic    Skin:  Warm and dry without any rash   NECK:  Supple without lymphadenopathy.   HEART:  Regular rate and rhythm. normal S1 and S2, No M/R/G  LUNGS:  Good ins/exp effort, no W/R/R/C  ABDOMEN:  Soft, nontender, nondistended with good bowel sounds heard  EXTREMITIES:  Without cyanosis, clubbing or edema.   NEUROLOGICAL:  Gross nonfocal       CBC Full  -  ( 2022 08:29 )  WBC Count : 15.50 K/uL  RBC Count : 4.80 M/uL  Hemoglobin : 14.7 g/dL  Hematocrit : 42.5 %  Platelet Count - Automated : 208 K/uL  Mean Cell Volume : 88.5 fl  Mean Cell Hemoglobin : 30.6 pg  Mean Cell Hemoglobin Concentration : 34.6 gm/dL  Auto Neutrophil # : x  Auto Lymphocyte # : x  Auto Monocyte # : x  Auto Eosinophil # : x  Auto Basophil # : x  Auto Neutrophil % : x  Auto Lymphocyte % : x  Auto Monocyte % : x  Auto Eosinophil % : x  Auto Basophil % : x    PT/INR - ( 2022 18:16 )   PT: 13.1 sec;   INR: 1.13 ratio         PTT - ( 2022 18:16 )  PTT:38.1 sec  Urinalysis Basic - ( 15 Nov 2022 01:16 )    Color: Yellow / Appearance: Clear / S.005 / pH: x  Gluc: x / Ketone: Trace  / Bili: Negative / Urobili: Negative   Blood: x / Protein: 15 / Nitrite: Negative   Leuk Esterase: Trace / RBC: 0-2 /HPF / WBC 0-2   Sq Epi: x / Non Sq Epi: Occasional / Bacteria: Occasional      -    142  |  116<H>  |  7   ----------------------------<  95  3.8   |  22  |  1.17    Ca    9.0      2022 08:29  Phos  2.0     -  Mg     2.2     -    TPro  7.4  /  Alb  3.4  /  TBili  0.6  /  DBili  x   /  AST  14<L>  /  ALT  42  /  AlkPhos  80  11-15      32 year-old gentleman with PMHx of asthma and seizure d/o, resent Appendectomy admitted for:     # Febrile syndrome in the setting of colitis / c. diff infection  - CT of the abdomen  - stool for CDT, GI PCR collected  - on zosyn 3.375 gm IV q8h # 2 and vancomycin 125 mg PO q6h # 2  - tolerating abx well so far; no side effects noted  - surgical evaluation appreciated  - advance diet per surgery    # TONIE, suspect ATN   due to contrast nephropathy   - resolved  - Avoid nephrotoxic meds   - C/w IVF   - If no improvement of renal Fx consider renal eval     # Asthma, stable  - Monitor Pulse ox, supplement via NC PRN  - C/w albuterol PRN     # H/o Seizure disorder   - Monitor closely   - C/w Gabapentin, Lamotrigine and Zonisamide     # DVT PPX: heparin SQ         
Date of service: 22 @ 10:02    Lying in bed in NAD  Abdominal pain is resolving  No loose stools overnight    ROS: no fever or chills; denies dizziness, no HA, no SOB or cough, no dysuria, no legs pain, no rashes    MEDICATIONS  (STANDING):  gabapentin 300 milliGRAM(s) Oral at bedtime  heparin   Injectable 5000 Unit(s) SubCutaneous every 8 hours  lamoTRIgine 150 milliGRAM(s) Oral two times a day  piperacillin/tazobactam IVPB.. 3.375 Gram(s) IV Intermittent every 8 hours  vancomycin    Solution 125 milliGRAM(s) Oral every 6 hours  zonisamide 300 milliGRAM(s) Oral at bedtime    Vital Signs Last 24 Hrs  T(C): 36.9 (2022 09:45), Max: 37 (2022 16:50)  T(F): 98.4 (2022 09:45), Max: 98.6 (2022 16:50)  HR: 78 (2022 09:45) (63 - 78)  BP: 116/80 (2022 09:45) (95/65 - 116/80)  BP(mean): --  RR: 99 (2022 09:45) (18 - 99)  SpO2: 98% (2022 06:26) (98% - 99%)    Parameters below as of 2022 06:26  Patient On (Oxygen Delivery Method): room air         Physical exam:    Constitutional:  No acute distress  HEENT: NC/AT, EOMI, PERRLA, conjunctivae clear; ears and nose atraumatic  Neck: supple; thyroid not palpable  Back: no tenderness  Respiratory: respiratory effort normal; clear to auscultation  Cardiovascular: S1S2 regular, no murmurs  Abdomen: soft, Right mid abdomen tender, mild distended, positive BS  Genitourinary: no suprapubic tenderness  Lymphatic: no LN palpable  Musculoskeletal: no muscle tenderness, no joint swelling or tenderness  Extremities: no pedal edema  Neurological/ Psychiatric: AxOx3, judgement and insight normal; moving all extremities  Skin: no rashes; no palpable lesions    Labs: reviewed                        14.2   9.97  )-----------( 223      ( 2022 08:02 )             40.2     11-17    140  |  114<H>  |  7   ----------------------------<  95  3.7   |  20<L>  |  1.09    Ca    8.9      2022 08:02  Phos  2.7     11-17  Mg     2.0     11-17                                   14.8   23.48 )-----------( 212      ( 15 Nov 2022 08:20 )             42.7     11-15    138  |  110<H>  |  14  ----------------------------<  114<H>  3.8   |  20<L>  |  1.49<H>    Ca    9.0      15 Nov 2022 08:20  Phos  2.6     -15  Mg     1.6     15    TPro  7.4  /  Alb  3.4  /  TBili  0.6  /  DBili  x   /  AST  14<L>  /  ALT  42  /  AlkPhos  80  15     LIVER FUNCTIONS - ( 15 Nov 2022 08:20 )  Alb: 3.4 g/dL / Pro: 7.4 gm/dL / ALK PHOS: 80 U/L / ALT: 42 U/L / AST: 14 U/L / GGT: x           Urinalysis Basic - ( 15 Nov 2022 01:16 )    Color: Yellow / Appearance: Clear / S.005 / pH: x  Gluc: x / Ketone: Trace  / Bili: Negative / Urobili: Negative   Blood: x / Protein: 15 / Nitrite: Negative   Leuk Esterase: Trace / RBC: 0-2 /HPF / WBC 0-2   Sq Epi: x / Non Sq Epi: Occasional / Bacteria: Occasional    ( @ 18:16)  NotDete    Culture - Urine (collected 15 Nov 2022 01:16)  Source: Clean Catch None  Final Report (2022 07:15):    <10,000 CFU/mL Normal Urogenital Monica    Culture - Blood (collected 2022 18:16)  Source: .Blood None  Preliminary Report (2022 03:02):    No growth to date.    Culture - Blood (collected 2022 18:16)  Source: .Blood None  Preliminary Report (2022 03:02):    No growth to date.    Radiology: all available radiological tests reviewed    < from: CT Abdomen and Pelvis w/ IV Cont (22 @ 18:59) >  IMPRESSION: Status post appendectomy. There is no abscess. Mild   thickening of cecum may represent colitis.  < end of copied text >    Advanced directives addressed: full resuscitation
Patient seen by surgical team this morning, Patient endorses his abdominal pain gotten much better than yesterday. Complaining of frequent loose bowel movement since yesterday, but denies any subjective fever or chills, cp, sob, n/v.     Vitals:  T(C): 36.6 ( @ 08:27), Max: 37.6 (11-15 @ 19:01)  HR: 74 ( @ 08:27) (74 - 98)  BP: 113/75 ( @ 08:27) (113/75 - 124/81)  RR: 18 ( @ 08:27) (18 - 18)  SpO2: 99% ( @ 08:27) (96% - 100%)      Physical Exam:  General: AAOx3, Well developed, NAD  Chest: Normal respiratory effort  Heart: RRR  Abdomen: Soft, minimal pain at the right lower quadrant. non distended, no masses  Neuro/Psych: No localized deficits. Normal speech, normal tone  Skin: Normal, no rashes, no lesions noted.   Extremities: Warm, well perfused, no edema, Pulses intact     @ 08:29                    14.7  CBC: 15.50>)-------(<208                     42.5                 142 | 116 | 7    CMP:  ----------------------< 95               3.8 | 22 | 1.17                      Ca:9.0  Phos:2.0  M.2               -|      |-        LFTs:  ------|-|-----             -|      |-  11-15 @ 08:20                    14.8  CBC: 23.48>)-------(<212                     42.7                 138 | 110 | 14    CMP:  ----------------------< 114               3.8 | 20 | 1.49                      Ca:9.0  Phos:2.6  M.6               0.6|      |14        LFTs:  ------|80|-----             -|      |-  11-15 @ 01:28                    13.9  CBC: 24.02>)-------(<210                     39.8                 - | - | -    CMP:  ----------------------< -               - | - | -                      Ca:-  Phos:-  Mg:-               -|      |-        LFTs:  ------|-|-----             -|      |-      Culture - Urine (collected 11-15-22 @ 01:16)  Source: Clean Catch None  Final Report (22 @ 07:15):    <10,000 CFU/mL Normal Urogenital Monica    Culture - Blood (collected 22 @ 18:16)  Source: .Blood None  Preliminary Report (22 @ 03:02):    No growth to date.    Culture - Blood (collected 22 @ 18:16)  Source: .Blood None  Preliminary Report (22 @ 03:02):    No growth to date.      Current Inpatient Medications:  acetaminophen     Tablet .. 650 milliGRAM(s) Oral every 6 hours PRN  acetaminophen     Tablet .. 650 milliGRAM(s) Oral every 6 hours PRN  albuterol    90 MICROgram(s) HFA Inhaler 2 Puff(s) Inhalation every 6 hours PRN  gabapentin 300 milliGRAM(s) Oral at bedtime  heparin   Injectable 5000 Unit(s) SubCutaneous every 8 hours  lactated ringers. 1000 milliLiter(s) (135 mL/Hr) IV Continuous <Continuous>  lamoTRIgine 150 milliGRAM(s) Oral two times a day  ondansetron Injectable 4 milliGRAM(s) IV Push every 8 hours PRN  piperacillin/tazobactam IVPB.. 3.375 Gram(s) IV Intermittent every 8 hours  vancomycin    Solution 125 milliGRAM(s) Oral every 6 hours  zonisamide 300 milliGRAM(s) Oral at bedtime        
Patient seen by surgical team this morning, Patient endorses his abdominal pain is better than yesterday. Still complains of frequent loose bowel movement, but denies any subjective fever or chills, cp, sob, n/v.     Physical Exam:  General: AAOx3, Well developed, NAD  Chest: Normal respiratory effort  Heart: RRR  Abdomen: Soft, non tender. mildly distended, no masses  Neuro/Psych: No localized deficits. Normal speech, normal tone  Skin: Normal, no rashes, no lesions noted.   Extremities: Warm, well perfused, no edema, Pulses intact        Vitals:  T(C): 36.8 ( @ 20:56), Max: 36.8 ( @ 20:56)  HR: 85 ( @ 20:56) (77 - 85)  BP: 137/87 ( @ 20:56) (126/77 - 137/87)  RR: 18 ( @ 20:56) (18 - 18)  SpO2: 99% ( @ 20:56) (99% - 99%)         @ 08:02                    14.2  CBC: 9.97>)-------(<223                     40.2                 140 | 114 | 7    CMP:  ----------------------< 95               3.7 | 20 | 1.09                      Ca:8.9  Phos:2.7  M.0               -|      |-        LFTs:  ------|-|-----             -|      |-   @ 08:29                    14.7  CBC: 15.50>)-------(<208                     42.5                 142 | 116 | 7    CMP:  ----------------------< 95               3.8 | 22 | 1.17                      Ca:9.0  Phos:2.0  M.2               -|      |-        LFTs:  ------|-|-----             -|      |-        Culture - Urine (collected 11-15-22 @ 01:16)  Source: Clean Catch None  Final Report (22 @ 07:15):    <10,000 CFU/mL Normal Urogenital Monica    Culture - Blood (collected 22 @ 18:16)  Source: .Blood None  Preliminary Report (22 @ 03:02):    No growth to date.    Culture - Blood (collected 22 @ 18:16)  Source: .Blood None  Preliminary Report (22 @ 03:02):    No growth to date.          Current Inpatient Medications:  acetaminophen     Tablet .. 650 milliGRAM(s) Oral every 6 hours PRN  acetaminophen     Tablet .. 650 milliGRAM(s) Oral every 6 hours PRN  albuterol    90 MICROgram(s) HFA Inhaler 2 Puff(s) Inhalation every 6 hours PRN  gabapentin 300 milliGRAM(s) Oral at bedtime  heparin   Injectable 5000 Unit(s) SubCutaneous every 8 hours  lactated ringers. 1000 milliLiter(s) (135 mL/Hr) IV Continuous <Continuous>  lamoTRIgine 150 milliGRAM(s) Oral two times a day  ondansetron Injectable 4 milliGRAM(s) IV Push every 8 hours PRN  piperacillin/tazobactam IVPB.. 3.375 Gram(s) IV Intermittent every 8 hours  vancomycin    Solution 125 milliGRAM(s) Oral every 6 hours  zonisamide 300 milliGRAM(s) Oral at bedtime                        
Patient was seen and examined at the bedside this morning  No acute events overnight  Pain is better controlled  No nausea or vomiting  Tolerating diet and passing normal less frequent bowel movements    O/E:  T(C): 36.9 (11-18-22 @ 09:45), Max: 37 (11-17-22 @ 16:50)  HR: 78 (11-18-22 @ 09:45) (63 - 78)  BP: 116/80 (11-18-22 @ 09:45) (95/65 - 116/80)  RR: 99 (11-18-22 @ 09:45) (18 - 99)  SpO2: 98% (11-18-22 @ 06:26) (98% - 99%)  Alert, conscious, oriented  No pallor, jaundice or cyanosis  Not in pain or distress  Chest clear, equal air entry bilaterally  Abdomen soft and lax, no tenderness, incisions clean and dry  Extremities: No swelling or tenderness                          15.3   8.49  )-----------( 218      ( 18 Nov 2022 07:14 )             44.2   11-18    140  |  112<H>  |  11  ----------------------------<  92  3.6   |  23  |  1.18    Ca    8.9      18 Nov 2022 08:49  Phos  3.1     11-18  Mg     2.3     11-18    MEDICATIONS  (STANDING):  gabapentin 300 milliGRAM(s) Oral at bedtime  heparin   Injectable 5000 Unit(s) SubCutaneous every 8 hours  lamoTRIgine 150 milliGRAM(s) Oral two times a day  vancomycin    Solution 125 milliGRAM(s) Oral every 6 hours  zonisamide 300 milliGRAM(s) Oral at bedtime    MEDICATIONS  (PRN):  acetaminophen     Tablet .. 650 milliGRAM(s) Oral every 6 hours PRN Moderate Pain (4 - 6)  acetaminophen     Tablet .. 650 milliGRAM(s) Oral every 6 hours PRN Moderate Pain (4 - 6)  albuterol    90 MICROgram(s) HFA Inhaler 2 Puff(s) Inhalation every 6 hours PRN for shortness of breath and/or wheezing  ondansetron Injectable 4 milliGRAM(s) IV Push every 8 hours PRN Nausea and/or Vomiting  
Date of service: 22 @ 11:29    Lying in bed in NAD  Alert and verbal  Has abdominal cramps  Has low grade fever  Has loose stools    ROS: denies dizziness, no HA, no SOB or cough, no dysuria, no legs pain, no rashes    MEDICATIONS  (STANDING):  gabapentin 300 milliGRAM(s) Oral at bedtime  heparin   Injectable 5000 Unit(s) SubCutaneous every 8 hours  lactated ringers. 1000 milliLiter(s) (135 mL/Hr) IV Continuous <Continuous>  lamoTRIgine 150 milliGRAM(s) Oral two times a day  piperacillin/tazobactam IVPB.. 3.375 Gram(s) IV Intermittent every 8 hours  vancomycin    Solution 125 milliGRAM(s) Oral every 6 hours  zonisamide 300 milliGRAM(s) Oral at bedtime    Vital Signs Last 24 Hrs  T(C): 36.6 (2022 08:27), Max: 37.6 (15 Nov 2022 19:01)  T(F): 97.9 (2022 08:27), Max: 99.7 (15 Nov 2022 19:01)  HR: 74 (2022 08:27) (74 - 98)  BP: 113/75 (2022 08:27) (113/75 - 124/81)  BP(mean): --  RR: 18 (2022 08:27) (18 - 18)  SpO2: 99% (2022 08:27) (96% - 100%)    Parameters below as of 2022 08:27  Patient On (Oxygen Delivery Method): room air     Physical exam:    Constitutional:  No acute distress  HEENT: NC/AT, EOMI, PERRLA, conjunctivae clear; ears and nose atraumatic  Neck: supple; thyroid not palpable  Back: no tenderness  Respiratory: respiratory effort normal; clear to auscultation  Cardiovascular: S1S2 regular, no murmurs  Abdomen: soft, mid abdomen tender, mild distended, positive BS  Genitourinary: no suprapubic tenderness  Lymphatic: no LN palpable  Musculoskeletal: no muscle tenderness, no joint swelling or tenderness  Extremities: no pedal edema  Neurological/ Psychiatric: AxOx3, judgement and insight normal; moving all extremities  Skin: no rashes; no palpable lesions    Labs: reviewed                        14.7   15.50 )-----------( 208      ( 2022 08:29 )             42.5     11-16    142  |  116<H>  |  7   ----------------------------<  95  3.8   |  22  |  1.17    Ca    9.0      2022 08:29  Phos  2.0     11-16  Mg     2.2     11-16    TPro  7.4  /  Alb  3.4  /  TBili  0.6  /  DBili  x   /  AST  14<L>  /  ALT  42  /  AlkPhos  80  11-15                        14.8   23.48 )-----------( 212      ( 15 Nov 2022 08:20 )             42.7     11-15    138  |  110<H>  |  14  ----------------------------<  114<H>  3.8   |  20<L>  |  1.49<H>    Ca    9.0      15 Nov 2022 08:20  Phos  2.6     11-15  Mg     1.6     11-15    TPro  7.4  /  Alb  3.4  /  TBili  0.6  /  DBili  x   /  AST  14<L>  /  ALT  42  /  AlkPhos  80  11-15     LIVER FUNCTIONS - ( 15 Nov 2022 08:20 )  Alb: 3.4 g/dL / Pro: 7.4 gm/dL / ALK PHOS: 80 U/L / ALT: 42 U/L / AST: 14 U/L / GGT: x           Urinalysis Basic - ( 15 Nov 2022 01:16 )    Color: Yellow / Appearance: Clear / S.005 / pH: x  Gluc: x / Ketone: Trace  / Bili: Negative / Urobili: Negative   Blood: x / Protein: 15 / Nitrite: Negative   Leuk Esterase: Trace / RBC: 0-2 /HPF / WBC 0-2   Sq Epi: x / Non Sq Epi: Occasional / Bacteria: Occasional    ( @ 18:16)  NotDetec    Culture - Urine (collected 15 Nov 2022 01:16)  Source: Clean Catch None  Final Report (2022 07:15):    <10,000 CFU/mL Normal Urogenital Monica    Culture - Blood (collected 2022 18:16)  Source: .Blood None  Preliminary Report (2022 03:02):    No growth to date.    Culture - Blood (collected 2022 18:16)  Source: .Blood None  Preliminary Report (2022 03:02):    No growth to date.    Radiology: all available radiological tests reviewed    < from: CT Abdomen and Pelvis w/ IV Cont (22 @ 18:59) >  IMPRESSION: Status post appendectomy. There is no abscess. Mild   thickening of cecum may represent colitis.  < end of copied text >    Advanced directives addressed: full resuscitation
Date of service: 22 @ 11:53    Lying in bed in NAD  Abdominal pain is improving  Diet advanced  Has loose stools    ROS: no fever or chills; denies dizziness, no HA, no SOB or cough, no dysuria, no legs pain, no rashes    MEDICATIONS  (STANDING):  gabapentin 300 milliGRAM(s) Oral at bedtime  heparin   Injectable 5000 Unit(s) SubCutaneous every 8 hours  lactated ringers. 1000 milliLiter(s) (135 mL/Hr) IV Continuous <Continuous>  lamoTRIgine 150 milliGRAM(s) Oral two times a day  piperacillin/tazobactam IVPB.. 3.375 Gram(s) IV Intermittent every 8 hours  vancomycin    Solution 125 milliGRAM(s) Oral every 6 hours  zonisamide 300 milliGRAM(s) Oral at bedtime    Vital Signs Last 24 Hrs  T(C): 36.7 (2022 08:23), Max: 36.8 (2022 20:56)  T(F): 98.1 (2022 08:23), Max: 98.2 (2022 20:56)  HR: 70 (2022 08:23) (70 - 85)  BP: 107/65 (2022 08:23) (107/65 - 137/87)  BP(mean): 77 (2022 08:23) (77 - 77)  RR: 18 (2022 08:23) (18 - 18)  SpO2: 99% (2022 08:23) (99% - 99%)    Parameters below as of 2022 08:23  Patient On (Oxygen Delivery Method): room air         Physical exam:    Constitutional:  No acute distress  HEENT: NC/AT, EOMI, PERRLA, conjunctivae clear; ears and nose atraumatic  Neck: supple; thyroid not palpable  Back: no tenderness  Respiratory: respiratory effort normal; clear to auscultation  Cardiovascular: S1S2 regular, no murmurs  Abdomen: soft, Right mid abdomen tender, mild distended, positive BS  Genitourinary: no suprapubic tenderness  Lymphatic: no LN palpable  Musculoskeletal: no muscle tenderness, no joint swelling or tenderness  Extremities: no pedal edema  Neurological/ Psychiatric: AxOx3, judgement and insight normal; moving all extremities  Skin: no rashes; no palpable lesions    Labs: reviewed                        14.2   9.97  )-----------( 223      ( 2022 08:02 )             40.2     11-17    140  |  114<H>  |  7   ----------------------------<  95  3.7   |  20<L>  |  1.09    Ca    8.9      2022 08:02  Phos  2.7     11-17  Mg     2.0     11-                                   14.8   23.48 )-----------( 212      ( 15 Nov 2022 08:20 )             42.7     11-15    138  |  110<H>  |  14  ----------------------------<  114<H>  3.8   |  20<L>  |  1.49<H>    Ca    9.0      15 Nov 2022 08:20  Phos  2.6     11-15  Mg     1.6     -15    TPro  7.4  /  Alb  3.4  /  TBili  0.6  /  DBili  x   /  AST  14<L>  /  ALT  42  /  AlkPhos  80  11-15     LIVER FUNCTIONS - ( 15 Nov 2022 08:20 )  Alb: 3.4 g/dL / Pro: 7.4 gm/dL / ALK PHOS: 80 U/L / ALT: 42 U/L / AST: 14 U/L / GGT: x           Urinalysis Basic - ( 15 Nov 2022 01:16 )    Color: Yellow / Appearance: Clear / S.005 / pH: x  Gluc: x / Ketone: Trace  / Bili: Negative / Urobili: Negative   Blood: x / Protein: 15 / Nitrite: Negative   Leuk Esterase: Trace / RBC: 0-2 /HPF / WBC 0-2   Sq Epi: x / Non Sq Epi: Occasional / Bacteria: Occasional    ( @ 18:16)  NotDete    Culture - Urine (collected 15 Nov 2022 01:16)  Source: Clean Catch None  Final Report (2022 07:15):    <10,000 CFU/mL Normal Urogenital Monica    Culture - Blood (collected 2022 18:16)  Source: .Blood None  Preliminary Report (2022 03:02):    No growth to date.    Culture - Blood (collected 2022 18:16)  Source: .Blood None  Preliminary Report (2022 03:02):    No growth to date.    Radiology: all available radiological tests reviewed    < from: CT Abdomen and Pelvis w/ IV Cont (22 @ 18:59) >  IMPRESSION: Status post appendectomy. There is no abscess. Mild   thickening of cecum may represent colitis.  < end of copied text >    Advanced directives addressed: full resuscitation

## 2022-11-18 NOTE — DISCHARGE NOTE NURSING/CASE MANAGEMENT/SOCIAL WORK - NSDCVIVACCINE_GEN_ALL_CORE_FT
influenza, injectable, quadrivalent, preservative free; 08-Nov-2022 12:36; Blanca Leong (RN); [employer]; [id number] (Exp. Date: 30-Jun-2023); IntraMuscular; Deltoid Left.; 0.5 milliLiter(s); VIS (VIS Published: 06-Aug-2021, VIS Presented: 08-Nov-2022);   Tdap; 24-Jan-2020 08:01; Deloris Hough (RN); [employer]; [id number] (Exp. Date: 22-Oct-2021); IntraMuscular; Deltoid Left.; 0.5 milliLiter(s); VIS (VIS Published: 09-May-2013, VIS Presented: 24-Jan-2020);   Tdap; 19-Aug-2021 01:02; Shanell Olivares (RN); [employer]; [id number] (Exp. Date: 28-Jan-2023); IntraMuscular; Deltoid Left.; 0.5 milliLiter(s); VIS (VIS Published: 09-May-2013, VIS Presented: 19-Aug-2021);

## 2022-11-18 NOTE — DISCHARGE NOTE PROVIDER - CARE PROVIDER_API CALL
David Fitzgerald)  Surgery  [address], [address]  [place] [address]  Phone: [phone]  Fax: [phone]  Follow Up Time: 2 weeks

## 2022-11-18 NOTE — DISCHARGE NOTE NURSING/CASE MANAGEMENT/SOCIAL WORK - PATIENT PORTAL LINK FT
You can access the FollowMyHealth Patient Portal offered by [facility] by registering at the following website: [email or web address]. By joining [facility]’s FollowMyHealth portal, you will also be able to view your health information using other applications (apps) compatible with our system.

## 2022-11-18 NOTE — PROGRESS NOTE ADULT - ASSESSMENT
A 32 year old M, s/p lap appy on 11/7/22, readmitted with septis, resoklved  C diff turned out to be positive.     Plan:  - ID recs: c.w zosyn and PO vanc  - Diet as tolerated  - serial abdominal exam  - monitor vitals  - Ambulate as tolerated  - Possible discharge today    Plan will be discussed with Dr. Fitzgerald A 32 year old M, s/p lap appy on 11/7/22, readmitted with sepsis, resolved  C diff turned out to be positive.     Plan:  - ID recs: c.w zosyn and PO vanc  - Diet as tolerated  - serial abdominal exam  - monitor vitals  - Ambulate as tolerated  - Possible discharge today    Plan will be discussed with Dr. Fitzgerald

## 2022-11-18 NOTE — PROGRESS NOTE ADULT - REASON FOR ADMISSION
Sepsis s/p lap appy

## 2022-11-18 NOTE — PHARMACOTHERAPY INTERVENTION NOTE - COMMENTS
Confirmed outpatient coverage of vanco po - $10 copay 
Medication reconciliation completed.  Reviewed Medication list and confirmed med allergies with patient; confirmed with Dr. First Medgarrett.

## 2022-11-18 NOTE — PROGRESS NOTE ADULT - ASSESSMENT
31 y/o male with h/o asthma and seizure disorder was admitted on 11/15 for recurrent abdominal pain. He underwent an uncomplicated laparoscopic appendectomy on 11/7/22 for acute appendicitis. He had abdominal soreness ever since surgery but was thinking it was a normal healing course. He developed subjective warmth, loss of appetite and worsen abdominal pain started 11/14/22. In ER he received zosyn.     1. Febrile syndrome resolving. CDAD. Typhlitis. Possible acute peritonitis s/p recent appendectomy. ARF resolving. Diarrheal syndrome CDAD.  -leukocytosis is improving  -abdominal cramps improving  -no abscess noted on repeat CT abdomen  -BC x 2, urine c/s noted  -stool for CDT noted  -on zosyn 3.375 gm IV q8h # 3 and vancomycin 125 mg PO q6h # 3  -tolerating abx well so far; no side effects noted  -surgical evaluation appreciated  -advance diet per surgery  -complete zosyn today  -continue vancomycin PO for 10 more days  -monitor abdomen  -monitor temps  -f/u CBC  -supportive care  2. Other issues:   -care per medicine

## 2022-11-18 NOTE — DISCHARGE NOTE PROVIDER - HOSPITAL COURSE
A 32 year old male who was admitted from the ED with sepsis and fever s/p lap appendectomy 1 week earlier. Patient was found to have inflammation of the cecum and terminal ileum with no evidence of abscess or collection on CT scan. He was also started on IV antibiotics and as his WBC were elevated, he was tested for C diff. This came back positive and he was started on PO vancomycin. His symptoms resolved and his diarrhea became better. He was started on diet and was able to tolerate. He was seen by ID and his antibiotics were managed as per the recommendations of ID. He was seen this morning and he has improved, having less frequent and more solid bowel movements. His leukocytosis have resolved and he was ready for discharge.

## 2022-11-18 NOTE — DISCHARGE NOTE PROVIDER - NSDCMRMEDTOKEN_GEN_ALL_CORE_FT
Albuterol (Eqv-Proventil HFA) 90 mcg/inh inhalation aerosol: 2 puff(s) inhaled every 4 hours, As Needed - for shortness of breath and/or wheezing  gabapentin 300 mg oral capsule: 1 cap(s) orally once a day (at bedtime)  lamoTRIgine 150 mg oral tablet: 1 tab(s) orally 2 times a day  zonisamide 100 mg oral capsule: 3 cap(s) orally once a day (at bedtime)   Albuterol (Eqv-Proventil HFA) 90 mcg/inh inhalation aerosol: 2 puff(s) inhaled every 4 hours, As Needed - for shortness of breath and/or wheezing  gabapentin 300 mg oral capsule: 1 cap(s) orally once a day (at bedtime)  lamoTRIgine 150 mg oral tablet: 1 tab(s) orally 2 times a day  vancomycin 125 mg oral capsule: 1 cap(s) orally every 4 hours   zonisamide 100 mg oral capsule: 3 cap(s) orally once a day (at bedtime)   Albuterol (Eqv-Proventil HFA) 90 mcg/inh inhalation aerosol: 2 puff(s) inhaled every 4 hours, As Needed - for shortness of breath and/or wheezing  gabapentin 300 mg oral capsule: 1 cap(s) orally once a day (at bedtime)  lamoTRIgine 150 mg oral tablet: 1 tab(s) orally 2 times a day  vancomycin 125 mg oral capsule: 1 cap(s) orally every 6 hours   zonisamide 100 mg oral capsule: 3 cap(s) orally once a day (at bedtime)

## 2022-11-20 LAB
CULTURE RESULTS: SIGNIFICANT CHANGE UP
CULTURE RESULTS: SIGNIFICANT CHANGE UP
SPECIMEN SOURCE: SIGNIFICANT CHANGE UP
SPECIMEN SOURCE: SIGNIFICANT CHANGE UP

## 2022-11-25 NOTE — CDI QUERY NOTE - NSCDIOTHERTXTBX_GEN_ALL_CORE_HH
Please document the relationship between the following conditions:  Sepsis  and s/p recent appendectomy    -Sepsis associated with /complication due to recent appendectomy and cfiff colitis  -Sepsis not associated with /complication due to recent appendectomy  -Unable to rule out Sepsis associated with /complication due to recent appendectomy  -Sepsis, associated with  Colitis cdiff   -Other please specify  -Unable to determine    SUPPORTING DOCUMENTATION AND/OR CLINICAL EVIDENCE:  ED- Post-operative infection    H&P-A 32 year-old gentleman who underwent lap appendectomy on 11/7 presented with septic symptoms, Vitals were pertinent for fever of 103 and tachycardia of 160, labs were significant for white count of 22 and lactic acid of 2.3. CT A/P shows mild cecal wall thickening correlating with cecal colitis. Patient resuscitated in the ED with 1 NS and 1 LR bolus with zosyn, now temperature is down to 100 and HR is down to 110s but lactic acid went up to 3.1    ID- Febrile syndrome. Probable sepsis. Typhlitis. Possible acute peritonitis s/p recent appendectomy. ARF. Diarrheal syndrome ?CDAD    Hospitalist 11/15-1. Febrile syndrome/ Sepsis, likely 2/2 Colitis Likely infectious etiology,  suspect CDIFF    DC summary-PRINCIPAL DISCHARGE DIAGNOSISDiagnosis: C. difficile colitisAssessment and Plan of Treatment: - Febrile syndrome resolving. CDAD. Typhlitis. Possible acute peritonitis s/p recent appendectomy. ARF resolving. Diarrheal syndrome CDAD.

## 2022-11-29 DIAGNOSIS — A04.72 ENTEROCOLITIS DUE TO CLOSTRIDIUM DIFFICILE, NOT SPECIFIED AS RECURRENT: ICD-10-CM

## 2022-11-29 DIAGNOSIS — K65.8 OTHER PERITONITIS: ICD-10-CM
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
## 2022-11-29 DIAGNOSIS — Z90.49 ACQUIRED ABSENCE OF OTHER SPECIFIED PARTS OF DIGESTIVE TRACT: ICD-10-CM

## 2022-11-29 DIAGNOSIS — J45.909 UNSPECIFIED ASTHMA, UNCOMPLICATED: ICD-10-CM
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
## 2022-11-29 DIAGNOSIS — T50.8X5A ADVERSE EFFECT OF DIAGNOSTIC AGENTS, INITIAL ENCOUNTER: ICD-10-CM

## 2022-11-29 DIAGNOSIS — N14.11 CONTRAST-INDUCED NEPHROPATHY: ICD-10-CM

## 2022-11-29 DIAGNOSIS — Z79.899 OTHER LONG TERM (CURRENT) DRUG THERAPY: ICD-10-CM

## 2022-11-29 DIAGNOSIS — A41.9 SEPSIS, UNSPECIFIED ORGANISM: ICD-10-CM

## 2022-11-29 DIAGNOSIS — G40.909 EPILEPSY, UNSPECIFIED, NOT INTRACTABLE, WITHOUT STATUS EPILEPTICUS: ICD-10-CM

## 2022-11-29 DIAGNOSIS — N17.0 ACUTE KIDNEY FAILURE WITH TUBULAR NECROSIS: ICD-10-CM

## 2023-05-30 NOTE — ED PROVIDER NOTE - CROS ED ROS STATEMENT
all other ROS negative except as per HPI PAST MEDICAL HISTORY:  Atrial fibrillation     Decreased white blood cell count     Diabetes on insulin    Hypertension     Nontoxic multinodular goiter     Pacemaker

## 2023-06-13 NOTE — ED PROVIDER NOTE - CROS ED HEME ALL NEG
Medication PA Requested:  Tirzepatide (MOUNJARO) 12.5 MG/0.5ML Subcutaneous Solution Pen-injector,                                                        CoverMyMeds Used:  Key: [id number]  Quantity: 6 mL  Day Supply: 90 days   Sig: Inject 12.5 mg into the skin once a week  DX Code: E11.65    Submitted epa with LOV & a1c  Awaiting determintion negative...

## 2023-08-15 NOTE — DISCHARGE NOTE PROVIDER - NSDCQMSTROKE_NEU_ALL_CORE
No

## 2023-09-12 NOTE — ED ADULT NURSE NOTE - CAS EDP DISCH DISPOSITION ADMI
K 3.3; hypercalcemia and TANNER noted  D/C HCTZ  RTC in 1 week for reevaluation; labs same day   3N/Telemetry

## 2023-11-07 ENCOUNTER — EMERGENCY (EMERGENCY)
Facility: HOSPITAL | Age: 33
LOS: 0 days | Discharge: HOME | End: 2023-11-07
Attending: EMERGENCY MEDICINE
Payer: COMMERCIAL

## 2023-11-07 VITALS
TEMPERATURE: 100 F | DIASTOLIC BLOOD PRESSURE: 87 MMHG | OXYGEN SATURATION: 99 % | SYSTOLIC BLOOD PRESSURE: 135 MMHG | HEART RATE: 85 BPM | RESPIRATION RATE: 18 BRPM

## 2023-11-07 VITALS — WEIGHT: 214.95 LBS

## 2023-11-07 DIAGNOSIS — Z87.81 PERSONAL HISTORY OF (HEALED) TRAUMATIC FRACTURE: Chronic | ICD-10-CM

## 2023-11-07 DIAGNOSIS — G40.909 EPILEPSY, UNSPECIFIED, NOT INTRACTABLE, WITHOUT STATUS EPILEPTICUS: ICD-10-CM

## 2023-11-07 DIAGNOSIS — H92.02 OTALGIA, LEFT EAR: ICD-10-CM

## 2023-11-07 DIAGNOSIS — H60.11 CELLULITIS OF RIGHT EXTERNAL EAR: ICD-10-CM

## 2023-11-07 DIAGNOSIS — J45.909 UNSPECIFIED ASTHMA, UNCOMPLICATED: ICD-10-CM

## 2023-11-07 PROCEDURE — 99283 EMERGENCY DEPT VISIT LOW MDM: CPT

## 2023-11-07 RX ORDER — IBUPROFEN 200 MG
600 TABLET ORAL ONCE
Refills: 0 | Status: COMPLETED | OUTPATIENT
Start: 2023-11-07 | End: 2023-11-07

## 2023-11-07 RX ADMIN — Medication 1 TABLET(S): at 14:58

## 2023-11-07 RX ADMIN — Medication 600 MILLIGRAM(S): at 14:58

## 2023-11-07 NOTE — ED STATDOCS - OBJECTIVE STATEMENT
32 y/o M with PMHx of epilepsy and asthma presents to the ED c/o ear pain. denies cp/sob/n/v/d/fever/chills. Pt was prescribed ear drops and has been using them 2x days with no relief. nonsmoker, nondrinker and no allergies to medication. Pt endorses pain while drinking water.

## 2023-11-07 NOTE — ED ADULT NURSE NOTE - OBJECTIVE STATEMENT
33y male presents to the ED c/o right ear infection. Pt reports onset of symptoms was 3 days ago, endorses chills. Right ear noted to be red and swollen.

## 2023-11-07 NOTE — ED STATDOCS - NSFOLLOWUPINSTRUCTIONS_ED_ALL_ED_FT
Otitis Externa    WHAT YOU NEED TO KNOW:    Otitis externa, or swimmer's ear, is an infection in the outer ear canal. This canal goes from the outside of the ear to the eardrum. Ear Anatomy         DISCHARGE INSTRUCTIONS:    Return to the emergency department if:     You have severe ear pain.      You are suddenly unable to hear at all.      You have new swelling in your face, behind your ears, or in your neck.      You suddenly cannot move part of your face.      Your face suddenly feels numb.    Contact your healthcare provider if:     You have a fever.       Your signs and symptoms do not get better after 2 days of treatment.       Your signs and symptoms go away for a time, but then come back.       You have questions or concerns about your condition or care.     Medicines:     NSAIDs, such as ibuprofen, help decrease swelling, pain, and fever. This medicine is available with or without a doctor's order. NSAIDs can cause stomach bleeding or kidney problems in certain people. If you take blood thinner medicine, always ask if NSAIDs are safe for you. Always read the medicine label and follow directions. Do not give these medicines to children under 6 months of age without direction from your child's healthcare provider.      Acetaminophen decreases pain and fever. It is available without a doctor's order. Ask how much to take and how often to take it. Follow directions. Acetaminophen can cause liver damage if not taken correctly.      Ear drops that contain an antibiotic may be given. The antibiotic helps treat a bacterial infection. You may also be given steroid medicine. The steroid helps decrease redness, swelling, and pain.       Take your medicine as directed. Contact your healthcare provider if you think your medicine is not helping or if you have side effects. Tell him or her if you are allergic to any medicine. Keep a list of the medicines, vitamins, and herbs you take. Include the amounts, and when and why you take them. Bring the list or the pill bottles to follow-up visits. Carry your medicine list with you in case of an emergency.    Follow up with your healthcare provider as directed: Write down your questions so you remember to ask them during your visits.    How to use eardrops:     Lie down on your side with your infected ear facing up.      Carefully drip the correct number of eardrops into your ear. Have another person help you if possible.      Gently move the outside part of your ear back and forth to help the medicine reach your ear canal.       Stay lying down in the same position (with your ear facing up) for 3 to 5 minutes.     Prevent otitis externa:     Do not put cotton swabs or foreign objects in your ears.      Wrap a clean moist washcloth around your finger, and use it to clean your outer ear and remove extra ear wax.       Use ear plugs when you swim. Dry your outer ears completely after you swim or bathe. ibuprofen for pain  warm compresses to the area  continue the ear drop  take the oral antibiotics        Otitis Externa    WHAT YOU NEED TO KNOW:    Otitis externa, or swimmer's ear, is an infection in the outer ear canal. This canal goes from the outside of the ear to the eardrum. Ear Anatomy         DISCHARGE INSTRUCTIONS:    Return to the emergency department if:     You have severe ear pain.      You are suddenly unable to hear at all.      You have new swelling in your face, behind your ears, or in your neck.      You suddenly cannot move part of your face.      Your face suddenly feels numb.    Contact your healthcare provider if:     You have a fever.       Your signs and symptoms do not get better after 2 days of treatment.       Your signs and symptoms go away for a time, but then come back.       You have questions or concerns about your condition or care.     Medicines:     NSAIDs, such as ibuprofen, help decrease swelling, pain, and fever. This medicine is available with or without a doctor's order. NSAIDs can cause stomach bleeding or kidney problems in certain people. If you take blood thinner medicine, always ask if NSAIDs are safe for you. Always read the medicine label and follow directions. Do not give these medicines to children under 6 months of age without direction from your child's healthcare provider.      Acetaminophen decreases pain and fever. It is available without a doctor's order. Ask how much to take and how often to take it. Follow directions. Acetaminophen can cause liver damage if not taken correctly.      Ear drops that contain an antibiotic may be given. The antibiotic helps treat a bacterial infection. You may also be given steroid medicine. The steroid helps decrease redness, swelling, and pain.       Take your medicine as directed. Contact your healthcare provider if you think your medicine is not helping or if you have side effects. Tell him or her if you are allergic to any medicine. Keep a list of the medicines, vitamins, and herbs you take. Include the amounts, and when and why you take them. Bring the list or the pill bottles to follow-up visits. Carry your medicine list with you in case of an emergency.    Follow up with your healthcare provider as directed: Write down your questions so you remember to ask them during your visits.    How to use eardrops:     Lie down on your side with your infected ear facing up.      Carefully drip the correct number of eardrops into your ear. Have another person help you if possible.      Gently move the outside part of your ear back and forth to help the medicine reach your ear canal.       Stay lying down in the same position (with your ear facing up) for 3 to 5 minutes.     Prevent otitis externa:     Do not put cotton swabs or foreign objects in your ears.      Wrap a clean moist washcloth around your finger, and use it to clean your outer ear and remove extra ear wax.       Use ear plugs when you swim. Dry your outer ears completely after you swim or bathe.

## 2023-11-07 NOTE — ED STATDOCS - PHYSICAL EXAMINATION
Constitutional: NAD   Eyes: PERRLA  Ear: redness and swelling to pinna, swelling to canal with crusting  Head: Normocephalic   Mouth: MMM  Cardiac: regular rate   Resp: Lungs CTAB  GI: Abd s/nt/nd, no rebound or guarding.  Neuro: awake, alert, moving all extremities  Skin: No rashes Constitutional: NAD   Eyes: PERRLA  Ear: redness and swelling to pinna, swelling to canal with crusting no mastoid tenderness full rom of neck  Head: Normocephalic   Mouth: MMM  Cardiac: regular rate   Resp: Lungs CTAB  GI: Abd s/nt/nd, no rebound or guarding.  Neuro: awake, alert, moving all extremities  Skin: No rashes

## 2023-11-07 NOTE — ED STATDOCS - CLINICAL SUMMARY MEDICAL DECISION MAKING FREE TEXT BOX
33-year-old male presents the emergency department for right ear pain and swelling.  Patient was diagnosed with otitis externa 2 days ago.  Was placed on Ciprodex however states pain is getting worse now pain to the entire right ear no mastoid tenderness to palpation no midline spinal tenderness full range of motion of neck no fevers exam with otitis externa with surrounding erythema on the right ear will add oral antibiotics no signs of mastoiditis malignant otitis externa meningitis RPA patient will need ENT follow-up.

## 2023-11-07 NOTE — ED STATDOCS - NS_ ATTENDINGSCRIBEDETAILS _ED_A_ED_FT
I, Abhishek Ferrera MD,  performed the initial face to face bedside interview with this patient regarding history of present illness, review of symptoms and relevant past medical, social and family history.  I completed an independent physical examination.  I was the initial provider who evaluated this patient.   I personally saw the patient and performed a substantive portion of the visit including all aspects of the medical decision making.  The history, relevant review of systems, past medical and surgical history, medical decision making, and physical examination was documented by the scribe in my presence and I attest to the accuracy of the documentation.

## 2023-11-07 NOTE — ED STATDOCS - PROGRESS NOTE DETAILS
pt presents with 3 days of right ear pain that has increased with  localize surrounding swelling and pain

## 2023-11-07 NOTE — ED STATDOCS - ATTENDING APP SHARED VISIT CONTRIBUTION OF CARE
I, Abhishek Ferrera MD, personally saw the patient with ACP.  I have personally performed a face to face diagnostic evaluation on this patient.  I have reviewed the ACP note and agree with the history, exam, and plan of care, except as noted.   The initial assessment was performed by myself and then the patient was handed off to the ACP. The patient was followed and re-evaluated by the ACP. All labs, imaging and procedures were evaluated and performed by the ACP and I was available for consultation if any questions in the patients care came up.

## 2023-11-07 NOTE — ED STATDOCS - PATIENT PORTAL LINK FT
You can access the FollowMyHealth Patient Portal offered by [facility] by registering at the following website: [email or web address]. By joining [facility]’s FollowMyHealth portal, you will also be able to view your health information using other applications (apps) compatible with our system.

## 2023-11-08 ENCOUNTER — NON-APPOINTMENT (OUTPATIENT)
Age: 33
End: 2023-11-08

## 2023-11-08 ENCOUNTER — APPOINTMENT (OUTPATIENT)
Dept: OTOLARYNGOLOGY | Facility: CLINIC | Age: 33
End: 2023-11-08
Payer: COMMERCIAL

## 2023-11-08 VITALS — BODY MASS INDEX: 31.84 KG/M2 | HEIGHT: 69 IN | WEIGHT: 215 LBS | TEMPERATURE: 97.9 F

## 2023-11-08 DIAGNOSIS — H60.391 OTHER INFECTIVE OTITIS EXTERNA, RIGHT EAR: ICD-10-CM

## 2023-11-08 PROCEDURE — 99204 OFFICE O/P NEW MOD 45 MIN: CPT | Mod: 25

## 2023-11-08 PROCEDURE — 92504 EAR MICROSCOPY EXAMINATION: CPT

## 2023-11-08 RX ORDER — OFLOXACIN OTIC 3 MG/ML
0.3 SOLUTION AURICULAR (OTIC) TWICE DAILY
Qty: 1 | Refills: 1 | Status: ACTIVE | COMMUNITY
Start: 2023-11-08 | End: 1900-01-01

## 2023-11-08 RX ORDER — AMOXICILLIN 875 MG/1
875 TABLET, FILM COATED ORAL
Refills: 0 | Status: ACTIVE | COMMUNITY

## 2023-11-08 RX ORDER — CIPROFLOXACIN 0.5 MG/.25ML
0.2 SOLUTION/ DROPS AURICULAR (OTIC)
Refills: 0 | Status: ACTIVE | COMMUNITY

## 2023-11-08 RX ORDER — AMOXICILLIN AND CLAVULANATE POTASSIUM 875; 125 MG/1; MG/1
875-125 TABLET, COATED ORAL
Qty: 14 | Refills: 0 | Status: ACTIVE | COMMUNITY
Start: 2023-11-08 | End: 1900-01-01

## 2023-11-15 ENCOUNTER — APPOINTMENT (OUTPATIENT)
Dept: OTOLARYNGOLOGY | Facility: CLINIC | Age: 33
End: 2023-11-15
Payer: COMMERCIAL

## 2023-11-15 VITALS — WEIGHT: 215 LBS | HEIGHT: 69 IN | BODY MASS INDEX: 31.84 KG/M2

## 2023-11-15 DIAGNOSIS — H60.311 DIFFUSE OTITIS EXTERNA, RIGHT EAR: ICD-10-CM

## 2023-11-15 DIAGNOSIS — H92.01 OTALGIA, RIGHT EAR: ICD-10-CM

## 2023-11-15 DIAGNOSIS — H90.3 SENSORINEURAL HEARING LOSS, BILATERAL: ICD-10-CM

## 2023-11-15 PROCEDURE — 92557 COMPREHENSIVE HEARING TEST: CPT

## 2023-11-15 PROCEDURE — 99214 OFFICE O/P EST MOD 30 MIN: CPT | Mod: 25

## 2023-11-15 PROCEDURE — 92504 EAR MICROSCOPY EXAMINATION: CPT

## 2023-11-15 PROCEDURE — 92567 TYMPANOMETRY: CPT

## 2023-12-07 ENCOUNTER — APPOINTMENT (OUTPATIENT)
Dept: OTOLARYNGOLOGY | Facility: CLINIC | Age: 33
End: 2023-12-07

## 2024-01-13 NOTE — ED PROVIDER NOTE - CPE EDP ENMT NORM
normal... For information on Fall & Injury Prevention, visit: [email or web address] OR  [email or web address] OR  https://www.cdc.gov/steadi/patient.html For information on Fall & Injury Prevention, visit: [email or web address] OR  [email or web address] OR  https://www.cdc.gov/steadi/patient.html

## 2024-01-24 ENCOUNTER — APPOINTMENT (OUTPATIENT)
Dept: OTOLARYNGOLOGY | Facility: CLINIC | Age: 34
End: 2024-01-24
Payer: COMMERCIAL

## 2024-01-24 VITALS — WEIGHT: 215 LBS | HEIGHT: 69 IN | BODY MASS INDEX: 31.84 KG/M2

## 2024-01-24 PROCEDURE — 69210 REMOVE IMPACTED EAR WAX UNI: CPT

## 2024-01-24 PROCEDURE — 99213 OFFICE O/P EST LOW 20 MIN: CPT | Mod: 25

## 2024-01-24 RX ORDER — AMOXICILLIN AND CLAVULANATE POTASSIUM 875; 125 MG/1; MG/1
875-125 TABLET, COATED ORAL TWICE DAILY
Qty: 20 | Refills: 2 | Status: COMPLETED | COMMUNITY
Start: 2024-01-24 | End: 2024-02-23

## 2024-01-24 NOTE — REVIEW OF SYSTEMS
[Ear Pain] : ear pain [Ear Itch] : ear itch [Hearing Loss] : hearing loss [Ear Drainage] : ear drainage [Negative] : Ear

## 2024-01-24 NOTE — PHYSICAL EXAM
[de-identified] : as canal swolen shut [Midline] : trachea located in midline position [Normal] : no rashes

## 2024-01-29 ENCOUNTER — APPOINTMENT (OUTPATIENT)
Dept: OTOLARYNGOLOGY | Facility: CLINIC | Age: 34
End: 2024-01-29
Payer: COMMERCIAL

## 2024-01-29 VITALS — HEIGHT: 69 IN | BODY MASS INDEX: 31.84 KG/M2 | WEIGHT: 215 LBS

## 2024-01-29 DIAGNOSIS — H60.312 DIFFUSE OTITIS EXTERNA, LEFT EAR: ICD-10-CM

## 2024-01-29 PROCEDURE — 99213 OFFICE O/P EST LOW 20 MIN: CPT | Mod: 25

## 2024-01-29 PROCEDURE — 69210 REMOVE IMPACTED EAR WAX UNI: CPT

## 2024-01-29 NOTE — PHYSICAL EXAM
[de-identified] : debris canal [Midline] : trachea located in midline position [Normal] : no rashes

## 2024-01-29 NOTE — PROCEDURE
[FreeTextEntry6] : Indication: Cannot adequately examine ear canal/tympanic membrane with otoscope. Findings debris cerumen cleared marked decrease canal edema

## 2024-01-29 NOTE — ASSESSMENT
[FreeTextEntry1] : cerumen clad debris cleared ot externa improved di powder continue amox clav and ofloxin gtts fu 1 week

## 2024-02-22 ENCOUNTER — APPOINTMENT (OUTPATIENT)
Dept: OTOLARYNGOLOGY | Facility: CLINIC | Age: 34
End: 2024-02-22

## 2024-04-09 NOTE — DISCHARGE NOTE NURSING/CASE MANAGEMENT/SOCIAL WORK - WAS YOUR LAST COVID-19 VACCINE GREATER THAN OR EQUAL TO TWO MONTHS AGO?
Yes [de-identified] : Patient comes for a physical with overall exertional or functional ability.  Continues to perimenopausal symptoms especially sweating at night.  Seen by GYN and uterine fibroid surgery versus.  Follow-up especially with decreased bleeding Acid reflux symptoms have improved with better dietary habits. No other acute

## 2024-09-14 NOTE — ED ADULT NURSE NOTE - PAIN: PRESENCE, MLM
Prep Survey      Flowsheet Row Responses   [facility] patient discharged from? [facility]   Is LACE score < 7 ? Yes   Eligibility [facility]   Date of Admission 09/13/24   Date of Discharge 09/14/24   Discharge Disposition Home or Self Care   Discharge diagnosis A-fib   Does the patient have one of the following disease processes/diagnoses(primary or secondary)? Other   Does the patient have Home health ordered? No   Is there a DME ordered? No   Prep survey completed? Yes            TOMAS QUINONEZ - Registered Nurse           complains of pain/discomfort

## 2025-04-16 NOTE — ED ADULT NURSE NOTE - PAIN RATING/NUMBER SCALE (0-10): ACTIVITY
Patient was contacted about her colonoscopy and was ok scheduling in [place], please call to schedule thank you    0

## 2025-05-27 NOTE — ED ADULT NURSE NOTE - ALCOHOL PRE SCREEN (AUDIT - C)
Medication passed protocol.     Medication: atorvastatin and lisinopril  Last office visit date: 2/27/25  Next appointment scheduled?: Yes 8/25/25  
Statement Selected
